# Patient Record
Sex: MALE | Race: WHITE | NOT HISPANIC OR LATINO | Employment: FULL TIME | URBAN - NONMETROPOLITAN AREA
[De-identification: names, ages, dates, MRNs, and addresses within clinical notes are randomized per-mention and may not be internally consistent; named-entity substitution may affect disease eponyms.]

---

## 2017-05-17 ENCOUNTER — OFFICE VISIT (OUTPATIENT)
Dept: OTOLARYNGOLOGY | Facility: CLINIC | Age: 52
End: 2017-05-17

## 2017-05-17 VITALS
DIASTOLIC BLOOD PRESSURE: 85 MMHG | TEMPERATURE: 97.8 F | BODY MASS INDEX: 30.44 KG/M2 | HEIGHT: 66 IN | HEART RATE: 80 BPM | WEIGHT: 189.38 LBS | SYSTOLIC BLOOD PRESSURE: 140 MMHG

## 2017-05-17 DIAGNOSIS — J01.90 ACUTE SINUSITIS, RECURRENCE NOT SPECIFIED, UNSPECIFIED LOCATION: ICD-10-CM

## 2017-05-17 DIAGNOSIS — J32.9 CHRONIC SINUSITIS, UNSPECIFIED LOCATION: ICD-10-CM

## 2017-05-17 DIAGNOSIS — T48.5X5A RHINITIS MEDICAMENTOSA: Primary | ICD-10-CM

## 2017-05-17 DIAGNOSIS — J31.0 RHINITIS MEDICAMENTOSA: Primary | ICD-10-CM

## 2017-05-17 DIAGNOSIS — J30.9 ALLERGIC RHINITIS, UNSPECIFIED ALLERGIC RHINITIS TRIGGER, UNSPECIFIED RHINITIS SEASONALITY: ICD-10-CM

## 2017-05-17 DIAGNOSIS — J34.3 HYPERTROPHY OF INFERIOR NASAL TURBINATE: ICD-10-CM

## 2017-05-17 PROCEDURE — 99203 OFFICE O/P NEW LOW 30 MIN: CPT | Performed by: NURSE PRACTITIONER

## 2017-05-17 RX ORDER — OMEPRAZOLE 20 MG/1
20 CAPSULE, DELAYED RELEASE ORAL DAILY
COMMUNITY

## 2017-05-17 RX ORDER — MOMETASONE FUROATE 50 UG/1
2 SPRAY, METERED NASAL DAILY
Qty: 17 G | Refills: 5 | Status: SHIPPED | OUTPATIENT
Start: 2017-05-17

## 2017-05-17 RX ORDER — OLOPATADINE HYDROCHLORIDE 665 UG/1
2 SPRAY NASAL 2 TIMES DAILY
Qty: 1 BOTTLE | Refills: 5 | Status: SHIPPED | OUTPATIENT
Start: 2017-05-17

## 2017-05-17 RX ORDER — PSEUDOEPHEDRINE HCL 30 MG
60 TABLET ORAL 3 TIMES DAILY PRN
Qty: 120 TABLET | Refills: 0 | Status: SHIPPED | OUTPATIENT
Start: 2017-05-17

## 2017-05-17 RX ORDER — FEXOFENADINE HCL 180 MG/1
TABLET ORAL
Refills: 3 | COMMUNITY
Start: 2017-05-02 | End: 2017-05-17

## 2017-05-17 RX ORDER — AMOXICILLIN AND CLAVULANATE POTASSIUM 875; 125 MG/1; MG/1
1 TABLET, FILM COATED ORAL 2 TIMES DAILY
Qty: 20 TABLET | Refills: 0 | Status: SHIPPED | OUTPATIENT
Start: 2017-05-17 | End: 2017-05-27

## 2017-05-17 RX ORDER — LEVOCETIRIZINE DIHYDROCHLORIDE 5 MG/1
5 TABLET, FILM COATED ORAL EVERY EVENING
Qty: 30 TABLET | Refills: 5 | Status: SHIPPED | OUTPATIENT
Start: 2017-05-17 | End: 2017-06-16

## 2017-05-17 RX ORDER — NEBIVOLOL HYDROCHLORIDE 5 MG/1
TABLET ORAL
Refills: 2 | COMMUNITY
Start: 2017-05-05

## 2017-05-30 ENCOUNTER — OFFICE VISIT (OUTPATIENT)
Dept: OTOLARYNGOLOGY | Facility: CLINIC | Age: 52
End: 2017-05-30

## 2017-05-30 VITALS
WEIGHT: 188.25 LBS | HEIGHT: 66 IN | SYSTOLIC BLOOD PRESSURE: 144 MMHG | DIASTOLIC BLOOD PRESSURE: 83 MMHG | HEART RATE: 84 BPM | TEMPERATURE: 98.6 F | BODY MASS INDEX: 30.25 KG/M2

## 2017-05-30 DIAGNOSIS — J34.3 HYPERTROPHY OF INFERIOR NASAL TURBINATE: ICD-10-CM

## 2017-05-30 DIAGNOSIS — J30.9 ALLERGIC RHINITIS, UNSPECIFIED ALLERGIC RHINITIS TRIGGER, UNSPECIFIED RHINITIS SEASONALITY: Primary | ICD-10-CM

## 2017-05-30 DIAGNOSIS — H10.13 ALLERGIC CONJUNCTIVITIS, BILATERAL: ICD-10-CM

## 2017-05-30 DIAGNOSIS — J32.9 CHRONIC SINUSITIS, UNSPECIFIED LOCATION: ICD-10-CM

## 2017-05-30 PROCEDURE — 99213 OFFICE O/P EST LOW 20 MIN: CPT | Performed by: NURSE PRACTITIONER

## 2017-05-30 RX ORDER — AZELASTINE HYDROCHLORIDE 0.5 MG/ML
1 SOLUTION/ DROPS OPHTHALMIC 2 TIMES DAILY
Qty: 1 EACH | Refills: 5 | Status: SHIPPED | OUTPATIENT
Start: 2017-05-30 | End: 2017-07-07

## 2017-06-20 DIAGNOSIS — J34.3 HYPERTROPHY OF INFERIOR NASAL TURBINATE: ICD-10-CM

## 2017-06-20 DIAGNOSIS — J30.9 ALLERGIC RHINITIS, UNSPECIFIED ALLERGIC RHINITIS TRIGGER, UNSPECIFIED RHINITIS SEASONALITY: ICD-10-CM

## 2017-06-20 DIAGNOSIS — J31.0 RHINITIS MEDICAMENTOSA: ICD-10-CM

## 2017-06-20 DIAGNOSIS — J01.90 ACUTE SINUSITIS, RECURRENCE NOT SPECIFIED, UNSPECIFIED LOCATION: ICD-10-CM

## 2017-06-20 DIAGNOSIS — T48.5X5A RHINITIS MEDICAMENTOSA: ICD-10-CM

## 2017-07-07 ENCOUNTER — OFFICE VISIT (OUTPATIENT)
Dept: OTOLARYNGOLOGY | Facility: CLINIC | Age: 52
End: 2017-07-07

## 2017-07-07 VITALS
HEART RATE: 56 BPM | DIASTOLIC BLOOD PRESSURE: 96 MMHG | SYSTOLIC BLOOD PRESSURE: 154 MMHG | BODY MASS INDEX: 29.73 KG/M2 | HEIGHT: 66 IN | WEIGHT: 185 LBS | TEMPERATURE: 97.7 F

## 2017-07-07 DIAGNOSIS — H10.13 ALLERGIC CONJUNCTIVITIS, BILATERAL: ICD-10-CM

## 2017-07-07 DIAGNOSIS — J34.3 HYPERTROPHY OF INFERIOR NASAL TURBINATE: ICD-10-CM

## 2017-07-07 DIAGNOSIS — T48.5X5A RHINITIS MEDICAMENTOSA: Primary | ICD-10-CM

## 2017-07-07 DIAGNOSIS — J30.9 ALLERGIC RHINITIS, UNSPECIFIED ALLERGIC RHINITIS TRIGGER, UNSPECIFIED RHINITIS SEASONALITY: ICD-10-CM

## 2017-07-07 DIAGNOSIS — J32.9 CHRONIC SINUSITIS, UNSPECIFIED LOCATION: ICD-10-CM

## 2017-07-07 DIAGNOSIS — J31.0 RHINITIS MEDICAMENTOSA: Primary | ICD-10-CM

## 2017-07-07 PROCEDURE — 99213 OFFICE O/P EST LOW 20 MIN: CPT | Performed by: NURSE PRACTITIONER

## 2017-07-07 NOTE — PROGRESS NOTES
YOB: 1965  Location: [facility]  Location Address: [address] [place] [address], [facility], [address] [place] [address]  Location Phone: [phone]    Chief Complaint   Patient presents with   • Allergic Rhinitis     Needs allergy testing       History of Present Illness  Cecil Saucedo is a 52 y.o. male.  Cecil Saucedo is here for evaluation of ENT complaints. The patient has had problems with nasal congestion, nasal obstruction and allergy  The symptoms are not localized to a particular location. The patient has had severe symptoms. The symptoms have been present for the last several months . The symptoms are aggravated by  allergy, OTC nasal sprays.  The symptoms are improved by medications.  Hx of septo/ITR  He is significantly improved with meds. He works for the [employer], mowing outside jobs.  He was unable to complete intradermal allergy testing due to beta blocker.  He is off of all meds for the time being.                     Past Medical History:   Diagnosis Date   • Allergic rhinitis    • Chronic sinusitis    • Deviated septum    • GERD (gastroesophageal reflux disease)    • Hypertension    • Hypertrophy of nasal turbinates    • Refractory obstruction of nasal airway    • Rhinitis medicamentosa        Past Surgical History:   Procedure Laterality Date   • ADENOIDECTOMY     • KNEE SURGERY     • NECK SURGERY     • SHOULDER SURGERY     • SINUS SURGERY      kest, FESS, ITR   • TONSILLECTOMY           Current Outpatient Prescriptions:   •  BYSTOLIC 5 MG tablet, TAKE 1 TABLET BY MOUTH ONCE DAILY, Disp: , Rfl: 2  •  omeprazole (priLOSEC) 20 MG capsule, Take 20 mg by mouth Daily., Disp: , Rfl:   •  mometasone (NASONEX) 50 MCG/ACT nasal spray, 2 sprays into each nostril Daily., Disp: 17 g, Rfl: 5  •  olopatadine (PATANASE) 0.6 % solution nasal solution, 2 sprays by Each Nare route 2 (Two) Times a Day., Disp: 1 bottle, Rfl: 5  •  pseudoephedrine (SUDAFED) 30 MG tablet, Take 2 tablets by mouth 3 (Three) Times a Day  As Needed for Congestion., Disp: 120 tablet, Rfl: 0    Review of patient's allergies indicates no known allergies.    Family History   Problem Relation Age of Onset   • No Known Problems Mother    • No Known Problems Father        Social History     Social History   • Marital status:      Spouse name: N/A   • Number of children: N/A   • Years of education: N/A     Occupational History   • Not on file.     Social History Main Topics   • Smoking status: Never Smoker   • Smokeless tobacco: Never Used   • Alcohol use No   • Drug use: Defer   • Sexual activity: Not on file     Other Topics Concern   • Not on file     Social History Narrative       Review of Systems   Constitutional: Negative.    HENT:        SEE HPI   Eyes: Negative.    Respiratory: Negative.    Cardiovascular: Negative.    Gastrointestinal: Negative.    Endocrine: Negative.    Genitourinary: Negative.    Musculoskeletal: Negative.    Skin: Negative.    Allergic/Immunologic: Negative.    Neurological: Negative.    Hematological: Negative.    Psychiatric/Behavioral: Negative.        Vitals:    07/07/17 1019   BP: 154/96   Pulse: 56   Temp: 97.7 °F (36.5 °C)       Objective     Physical Exam  CONSTITUTIONAL: well nourished, alert, oriented, in no acute distress     COMMUNICATION AND VOICE: able to communicate normally, normal voice quality    HEAD: normocephalic, no lesions, atraumatic, no tenderness, no masses     FACE: appearance normal, no lesions, no tenderness, no deformities, facial motion symmetric    SALIVARY GLANDS: parotid glands with no tenderness, no swelling, no masses, submandibular glands with normal size, nontender    EYES: ocular motility normal, eyelids normal, orbits normal, no proptosis, conjunctiva inflammation, pupils equal, round     EARS:  Hearing: response to conversational voice normal bilaterally   External Ears: auricles without lesions  Otoscopic: tympanic membrane appearance normal, no lesions, no perforation, normal  mobility, no fluid    NOSE:  External Nose: structure normal, no tenderness on palpation, no nasal discharge, no lesions, no evidence of trauma, nostrils patent   Intranasal Exam: nasal mucosa edema, vestibule within normal limits, inferior turbinate hypertrophy moderate, nasal septum midline    ORAL:  Lips: upper and lower lips without lesion   Teeth: dentition within normal limits for age   Gums: gingivae healthy   Oral Mucosa: oral mucosa normal, no mucosal lesions   Floor of Mouth: Warthin’s duct patent, mucosa normal  Tongue: lingual mucosa normal without lesions, normal tongue mobility   Palate: soft and hard palates with normal mucosa and structure  Oropharynx: oropharyngeal mucosa normal    NECK: neck appearance normal, no mass,  noted without erythema or tenderness    THYROID: no overt thyromegaly, no tenderness, nodules or mass present on palpation, position midline     LYMPH NODES: no lymphadenopathy    CHEST/RESPIRATORY: respiratory effort normal    CARDIOVASCULAR: extremities without cyanosis or edema      NEUROLOGIC/PSYCHIATRIC: oriented to time, place and person, mood normal, affect appropriate, CN II-XII intact grossly    Assessment/Plan   Cecil was seen today for allergic rhinitis.    Diagnoses and all orders for this visit:    Rhinitis medicamentosa    Allergic rhinitis, unspecified allergic rhinitis trigger, unspecified rhinitis seasonality    Hypertrophy of inferior nasal turbinate    Allergic conjunctivitis, bilateral    Chronic sinusitis, unspecified location      * Surgery not found *  No orders of the defined types were placed in this encounter.    Return in about 6 months (around 1/7/2018).       Patient Instructions   Avoidance of allergies discussed.  Use masks when performing yardwork or cleaning activities if indicated.  Continue allergy medications as discussed.    Air purifier as needed.  If on nasal sprays, recommend to use daily as indicated.   Medical vs surgical options  discussed.    Immunocap allergy testing    Resume meds

## 2020-02-17 ENCOUNTER — NURSE TRIAGE (OUTPATIENT)
Dept: CALL CENTER | Facility: HOSPITAL | Age: 55
End: 2020-02-17

## 2020-02-18 NOTE — TELEPHONE ENCOUNTER
"Reviewed guideline with caller advises he be seen within 4 hours. Caller agrees to follow care advice.   Reason for Disposition  • [1] SEVERE mouth pain (e.g., excruciating) AND [2] not improved after 2 hours of pain medicine    Additional Information  • Negative: Severe difficulty breathing (e.g., struggling for each breath, speaks in single words, stridor)  • Negative: Sounds like a life-threatening emergency to the triager  • Negative: Followed a tooth (or teeth) injury  • Negative: Followed a mouth injury  • Negative: Throat is painful  • Negative: Canker sore suspected (i.e., aphthous ulcer) in the mouth  • Negative: Cold sore suspected (i.e., fever blister sore) on the outer lip  • Negative: Tooth is painful or swelling around a tooth  • Negative: [1] Drooling or spitting out saliva (because can't swallow) AND [2] new onset  • Negative: Electrical burn of mouth  • Negative: Chemical burn of mouth  • Negative: Tongue is very swollen and tender  • Negative: [1] Difficulty breathing AND [2] not severe  • Negative: [1] Face is swollen AND [2] fever  • Negative: [1] Drinking very little AND [2] dehydration suspected (e.g., no urine > 12 hours, very dry mouth, very lightheaded)  • Negative: Patient sounds very sick or weak to the triager    Answer Assessment - Initial Assessment Questions  1. ONSET: \"When did the mouth start hurting?\" (e.g., hours or days ago)       1800 this evening, but has had it off and on for about a week  2. SEVERITY: \"How bad is the pain?\" (Scale 1-10; mild, moderate or severe)    - MILD (1-3):  doesn't interfere with eating or normal activities    - MODERATE (4-7): interferes with eating some solids and normal activities    - SEVERE (8-10):  excruciating pain, interferes with most normal activities    - SEVERE DYSPHAGIA: can't swallow liquids, drooling      10/10  3. SORES: \"Are there any sores or ulcers in the mouth?\" If so, ask: \"What part of the mouth are the sores in?\"      no  4. FEVER: " "\"Do you have a fever?\" If so, ask: \"What is your temperature, how was it measured, and when did it start?\"      No   5. CAUSE: \"What do you think is causing the mouth pain?\"      Diagnosed with trigeminal neuralgia   6. OTHER SYMPTOMS: \"Do you have any other symptoms?\" (e.g., difficulty breathing)      no    Protocols used: MOUTH PAIN-ADULT-AH      "

## 2021-10-04 ENCOUNTER — HOSPITAL ENCOUNTER (OUTPATIENT)
Dept: CT IMAGING | Age: 56
Discharge: HOME | End: 2021-10-04
Payer: COMMERCIAL

## 2021-10-04 ENCOUNTER — HOSPITAL ENCOUNTER (OUTPATIENT)
Dept: INFUSION THERAPY | Age: 56
Setting detail: INFUSION SERIES
Discharge: HOME | End: 2021-10-04
Payer: COMMERCIAL

## 2021-10-04 VITALS
HEART RATE: 54 BPM | TEMPERATURE: 97.2 F | OXYGEN SATURATION: 98 % | DIASTOLIC BLOOD PRESSURE: 72 MMHG | RESPIRATION RATE: 18 BRPM | SYSTOLIC BLOOD PRESSURE: 129 MMHG

## 2021-10-04 DIAGNOSIS — U07.1 COVID-19: Primary | ICD-10-CM

## 2021-10-04 DIAGNOSIS — R07.1 CHEST PAIN ON BREATHING: ICD-10-CM

## 2021-10-04 DIAGNOSIS — U07.1 COVID-19: ICD-10-CM

## 2021-10-04 PROCEDURE — 6360000002 HC RX W HCPCS: Performed by: NURSE PRACTITIONER

## 2021-10-04 PROCEDURE — 6360000004 HC RX CONTRAST MEDICATION: Performed by: NURSE PRACTITIONER

## 2021-10-04 PROCEDURE — 71275 CT ANGIOGRAPHY CHEST: CPT

## 2021-10-04 PROCEDURE — M0243 CASIRIVI AND IMDEVI INFUSION: HCPCS

## 2021-10-04 PROCEDURE — 96374 THER/PROPH/DIAG INJ IV PUSH: CPT

## 2021-10-04 PROCEDURE — 6370000000 HC RX 637 (ALT 250 FOR IP): Performed by: NURSE PRACTITIONER

## 2021-10-04 PROCEDURE — 2580000003 HC RX 258: Performed by: NURSE PRACTITIONER

## 2021-10-04 RX ORDER — DIPHENHYDRAMINE HYDROCHLORIDE 50 MG/ML
25 INJECTION INTRAMUSCULAR; INTRAVENOUS ONCE
Status: CANCELLED
Start: 2021-10-04 | End: 2021-10-04

## 2021-10-04 RX ORDER — SODIUM CHLORIDE 9 MG/ML
INJECTION, SOLUTION INTRAVENOUS CONTINUOUS
Status: CANCELLED | OUTPATIENT
Start: 2021-10-04

## 2021-10-04 RX ORDER — AMLODIPINE AND OLMESARTAN MEDOXOMIL 5; 40 MG/1; MG/1
TABLET ORAL
COMMUNITY
Start: 2021-09-27

## 2021-10-04 RX ORDER — SODIUM CHLORIDE 0.9 % (FLUSH) 0.9 %
5-40 SYRINGE (ML) INJECTION PRN
Status: CANCELLED | OUTPATIENT
Start: 2021-10-04

## 2021-10-04 RX ORDER — METHYLPREDNISOLONE SODIUM SUCCINATE 125 MG/2ML
125 INJECTION, POWDER, LYOPHILIZED, FOR SOLUTION INTRAMUSCULAR; INTRAVENOUS ONCE
Status: CANCELLED | OUTPATIENT
Start: 2021-10-04 | End: 2021-10-04

## 2021-10-04 RX ORDER — BISOPROLOL FUMARATE AND HYDROCHLOROTHIAZIDE 10; 6.25 MG/1; MG/1
TABLET ORAL
COMMUNITY
Start: 2021-09-27

## 2021-10-04 RX ORDER — DIPHENHYDRAMINE HYDROCHLORIDE 50 MG/ML
50 INJECTION INTRAMUSCULAR; INTRAVENOUS ONCE
Status: CANCELLED | OUTPATIENT
Start: 2021-10-04 | End: 2021-10-04

## 2021-10-04 RX ORDER — ACETAMINOPHEN 325 MG/1
650 TABLET ORAL ONCE
Status: COMPLETED | OUTPATIENT
Start: 2021-10-04 | End: 2021-10-04

## 2021-10-04 RX ORDER — ACETAMINOPHEN 325 MG/1
650 TABLET ORAL ONCE
Status: CANCELLED
Start: 2021-10-04 | End: 2021-10-04

## 2021-10-04 RX ORDER — SODIUM CHLORIDE 0.9 % (FLUSH) 0.9 %
5-40 SYRINGE (ML) INJECTION PRN
Status: DISCONTINUED | OUTPATIENT
Start: 2021-10-04 | End: 2021-10-05 | Stop reason: HOSPADM

## 2021-10-04 RX ORDER — DIPHENHYDRAMINE HYDROCHLORIDE 50 MG/ML
25 INJECTION INTRAMUSCULAR; INTRAVENOUS ONCE
Status: COMPLETED | OUTPATIENT
Start: 2021-10-04 | End: 2021-10-04

## 2021-10-04 RX ORDER — OMEPRAZOLE 40 MG/1
CAPSULE, DELAYED RELEASE ORAL
COMMUNITY
Start: 2021-09-18

## 2021-10-04 RX ORDER — EPINEPHRINE 1 MG/ML
0.3 INJECTION, SOLUTION, CONCENTRATE INTRAVENOUS PRN
Status: CANCELLED | OUTPATIENT
Start: 2021-10-04

## 2021-10-04 RX ORDER — SODIUM CHLORIDE 9 MG/ML
INJECTION, SOLUTION INTRAVENOUS CONTINUOUS
Status: DISCONTINUED | OUTPATIENT
Start: 2021-10-04 | End: 2021-10-04 | Stop reason: HOSPADM

## 2021-10-04 RX ADMIN — IOPAMIDOL 90 ML: 755 INJECTION, SOLUTION INTRAVENOUS at 14:56

## 2021-10-04 RX ADMIN — SODIUM CHLORIDE: 9 INJECTION, SOLUTION INTRAVENOUS at 12:57

## 2021-10-04 RX ADMIN — DIPHENHYDRAMINE HYDROCHLORIDE 25 MG: 50 INJECTION, SOLUTION INTRAMUSCULAR; INTRAVENOUS at 12:57

## 2021-10-04 RX ADMIN — ACETAMINOPHEN 650 MG: 325 TABLET ORAL at 12:57

## 2021-10-04 RX ADMIN — CASIRIVIMAB AND IMDEVIMAB 1200 MG: 600; 600 INJECTION, SOLUTION, CONCENTRATE INTRAVENOUS at 13:06

## 2021-10-04 ASSESSMENT — PAIN SCALES - GENERAL: PAINLEVEL_OUTOF10: 0

## 2021-10-04 NOTE — PROGRESS NOTES
PIV R FA left in place for patient to have CT w/ contrast.      Patient tolerated monoclonal antibody infusion therapy without s/s of adverse reaction. Patient observed one hour post observation. Discharge instructions provided. Patient verbalizes understanding of discharge instructions and advice to seek emergent medical care in the event of adverse reaction (fever, chills, changes in heartbeat, shortness of air, wheezing, swelling of lips, face or throat, rash including hives, itching, headache, nausea, vomiting, sweating, muscle aches, dizziness and shivering. Patient discharged from monoclonal clinic to home.  Electronically signed by Caden Bruner RN on 10/4/2021 at 2:27 PM

## 2021-10-04 NOTE — PROGRESS NOTES
Patient/Caregiver given FACT SHEET for EMERGENCY USE AUTHORIZATION for monoclonal antibody therapy. Patient/Caregiver verbalize understanding of EMERGENCY USE AUTHORIZATION and understand full FDA approval has not been granted because medication is still being studied. Patient/Caregiver verbalize understanding regarding known safety and effectiveness and understand limited data is available regarding full risks and benefits. Patient/Caregiver understand there is a continued need to self-isolate and continue all infection control measures. Patient/Caregiver choose to proceed with monoclonal antibody infusion therapy. Patient/Caregiver signed a copy of the fact sheet for emergency use authorization. Copy placed in patient medical record.  Electronically signed by Yasmeen Buckley RN on 10/4/2021 at 12:57 PM

## 2023-08-07 ENCOUNTER — HOSPITAL ENCOUNTER (OUTPATIENT)
Dept: CT IMAGING | Age: 58
Discharge: HOME | End: 2023-08-07
Payer: COMMERCIAL

## 2023-08-07 DIAGNOSIS — R10.12 LEFT UPPER QUADRANT PAIN: ICD-10-CM

## 2023-08-07 DIAGNOSIS — N23 UNSPECIFIED RENAL COLIC: ICD-10-CM

## 2023-08-07 PROCEDURE — 74176 CT ABD & PELVIS W/O CONTRAST: CPT

## 2023-08-14 NOTE — PROGRESS NOTES
Chief Complaint  Kidney Stones    Subjective          Cecil Saucedo presents to [facility]   Patient with a right ureteropelvic junction obstruction secondary to calculus.  Severity is mild.  He is asymptomatic from this most likely.  He has been having about 3 weeks of severe pain in of his left epigastrium radiates through to the back.  He has had CT scan to evaluate this.  He is already on a proton pump inhibitor.  It is not related to meals, activity or anything else he can discern.      Current Outpatient Medications:     amlodipine-olmesartan (ANGEL) 10-40 MG per tablet, , Disp: , Rfl:     omeprazole (priLOSEC) 20 MG capsule, Take 1 capsule by mouth Daily., Disp: , Rfl:     tamsulosin (FLOMAX) 0.4 MG capsule 24 hr capsule, Take 1 capsule by mouth Daily., Disp: , Rfl:     vitamin D (ERGOCALCIFEROL) 1.25 MG (24321 UT) capsule capsule, Take 1 capsule by mouth 1 (One) Time Per Week., Disp: , Rfl:     BYSTOLIC 5 MG tablet, TAKE 1 TABLET BY MOUTH ONCE DAILY (Patient not taking: Reported on 8/17/2023), Disp: , Rfl: 2    mometasone (NASONEX) 50 MCG/ACT nasal spray, 2 sprays into each nostril Daily. (Patient not taking: Reported on 8/17/2023), Disp: 17 g, Rfl: 5    olopatadine (PATANASE) 0.6 % solution nasal solution, 2 sprays by Each Nare route 2 (Two) Times a Day. (Patient not taking: Reported on 8/17/2023), Disp: 1 bottle, Rfl: 5    pseudoephedrine (SUDAFED) 30 MG tablet, Take 2 tablets by mouth 3 (Three) Times a Day As Needed for Congestion. (Patient not taking: Reported on 8/17/2023), Disp: 120 tablet, Rfl: 0  Past Medical History:   Diagnosis Date    Allergic conjunctivitis     Allergic rhinitis     Chronic sinusitis     Deviated septum     GERD (gastroesophageal reflux disease)     Hypertension     Hypertrophy of nasal turbinates     Refractory obstruction of nasal airway     Rhinitis medicamentosa      Past Surgical History:   Procedure Laterality Date    ADENOIDECTOMY      KNEE SURGERY  "     NECK SURGERY      SHOULDER SURGERY      SINUS SURGERY  2010    jimmy, FESS, ITR    TONSILLECTOMY             Review of Systems      Objective   PHYSICAL EXAM  Vital Signs:   Temp 96 øF (35.6 øC)   Ht 167.6 cm (66\")   Wt 88.1 kg (194 lb 3.2 oz)   BMI 31.34 kg/mý     Physical Exam  Constitutional: Patient is without distress or deformity.  Vital signs are reviewed as above.    Neuro: No confusion; No disorientation; Alert and oriented  Pulmonary: No respiratory distress.   Skin: No pallor or diaphoresis  : No CVA tenderness over the flank.  GI: Abdomen is soft nontender nondistended no left upper quadrant to palpation.  Musculoskeletal: I cannot pinpoint the discomfort palpation of his torso musculature.  Does not reproduce pain when you have him raise the left leg.    DATA  Result Review :              Results for orders placed or performed in visit on 08/17/23   POC Urinalysis Dipstick, Multipro    Specimen: Urine   Result Value Ref Range    Color Yellow Yellow, Straw, Dark Yellow, Rubina    Clarity, UA Clear Clear    Glucose, UA Negative Negative mg/dL    Bilirubin Negative Negative    Ketones, UA Negative Negative    Specific Gravity  1.025 1.005 - 1.030    Blood, UA Trace (A) Negative    pH, Urine 6.0 5.0 - 8.0    Protein, POC Negative Negative mg/dL    Urobilinogen, UA 0.2 E.U./dL Normal, 0.2 E.U./dL    Nitrite, UA Negative Negative    Leukocytes Negative Negative     CT outside films (08/07/2023 09:03)   The images for the above \"link(s)\" were made available to me to review independently.  I also reviewed the radiologist's report described above with regard to the urologic findings. My interpretation is as follows:  There is a 6 mm calculus at the ureteropelvic junction with what I would describe is mild hydronephrosis.  Not much perinephric inflammation noted.  Moreover I do not see an etiology of his left upper quadrant discomfort.  /Abdelrahman Aguirre MD                   ASSESSMENT AND PLAN      "     Problem List Items Addressed This Visit    None  Visit Diagnoses       Obstruction of right ureteropelvic junction (UPJ) due to stone    -  Primary    Relevant Orders    CT Abdomen With & Without Contrast    Kidney stones        Relevant Orders    POC Urinalysis Dipstick, Multipro (Completed)    XR abdomen kub (Completed)    Left upper quadrant pain        Relevant Orders    CT Abdomen With & Without Contrast        I did explain to them that I have seen patients have contralateral discomfort from a obstructing stone before but that it is very unusual.  I cannot really explain the neurologic pattern to get discomfort like that.  I still think the pain is likely to another etiology.  I do think we should check on the status of the stone since there was some mild obstruction.  I recommended that he get a CT of the abdomen pelvis with and without contrast.  I do think he should get contrasted study that has oral contrast as well.      FOLLOW UP     No follow-ups on file.        (Please note that portions of this note were completed with a voice recognition program.)  Abdelrahman Aguirre MD  08/18/23  12:30 CDT

## 2023-08-16 ENCOUNTER — TELEPHONE (OUTPATIENT)
Dept: UROLOGY | Facility: CLINIC | Age: 58
End: 2023-08-16
Payer: COMMERCIAL

## 2023-08-17 ENCOUNTER — OFFICE VISIT (OUTPATIENT)
Dept: UROLOGY | Facility: CLINIC | Age: 58
End: 2023-08-17
Payer: COMMERCIAL

## 2023-08-17 ENCOUNTER — HOSPITAL ENCOUNTER (OUTPATIENT)
Dept: GENERAL RADIOLOGY | Facility: HOSPITAL | Age: 58
Discharge: HOME | End: 2023-08-17
Admitting: UROLOGY
Payer: COMMERCIAL

## 2023-08-17 VITALS — TEMPERATURE: 96 F | HEIGHT: 66 IN | BODY MASS INDEX: 31.21 KG/M2 | WEIGHT: 194.2 LBS

## 2023-08-17 DIAGNOSIS — N20.1 OBSTRUCTION OF RIGHT URETEROPELVIC JUNCTION (UPJ) DUE TO STONE: Primary | ICD-10-CM

## 2023-08-17 DIAGNOSIS — N20.0 KIDNEY STONES: ICD-10-CM

## 2023-08-17 DIAGNOSIS — R10.12 LEFT UPPER QUADRANT PAIN: ICD-10-CM

## 2023-08-17 LAB
BILIRUB BLD-MCNC: NEGATIVE MG/DL
CLARITY, POC: CLEAR
COLOR UR: YELLOW
GLUCOSE UR STRIP-MCNC: NEGATIVE MG/DL
KETONES UR QL: NEGATIVE
LEUKOCYTE EST, POC: NEGATIVE
NITRITE UR-MCNC: NEGATIVE MG/ML
PH UR: 6 [PH] (ref 5–8)
PROT UR STRIP-MCNC: NEGATIVE MG/DL
RBC # UR STRIP: ABNORMAL /UL
SP GR UR: 1.02 (ref 1–1.03)
UROBILINOGEN UR QL: ABNORMAL

## 2023-08-17 PROCEDURE — 74018 RADEX ABDOMEN 1 VIEW: CPT

## 2023-08-17 RX ORDER — AMLODIPINE AND OLMESARTAN MEDOXOMIL 10; 40 MG/1; MG/1
TABLET ORAL
COMMUNITY
Start: 2022-08-01

## 2023-08-17 RX ORDER — TAMSULOSIN HYDROCHLORIDE 0.4 MG/1
1 CAPSULE ORAL DAILY
COMMUNITY
Start: 2023-08-09

## 2023-08-17 RX ORDER — ERGOCALCIFEROL 1.25 MG/1
1 CAPSULE ORAL WEEKLY
COMMUNITY
Start: 2023-08-08

## 2023-08-22 DIAGNOSIS — N20.1 OBSTRUCTION OF RIGHT URETEROPELVIC JUNCTION (UPJ) DUE TO STONE: ICD-10-CM

## 2023-08-22 DIAGNOSIS — R10.13 EPIGASTRIC PAIN: ICD-10-CM

## 2023-08-22 DIAGNOSIS — R10.13 EPIGASTRIC PAIN: Primary | ICD-10-CM

## 2023-08-22 DIAGNOSIS — R10.12 LEFT UPPER QUADRANT PAIN: ICD-10-CM

## 2023-08-28 ENCOUNTER — TELEPHONE (OUTPATIENT)
Dept: UROLOGY | Facility: CLINIC | Age: 58
End: 2023-08-28
Payer: COMMERCIAL

## 2023-08-28 NOTE — TELEPHONE ENCOUNTER
Patients wife called to see if a referral had been put in for the patient yet. I let her know nothing had been put in, and that Dr. Aguirre would be out of the office until next Tuesday. She will call patients PCP to see about them sending a referral so the patient does not have to wait over a week since he is still in pain. I let her know if they need anything or if I can help in any way to please give me a call.

## 2023-09-08 ENCOUNTER — TRANSCRIBE ORDERS (OUTPATIENT)
Dept: ADMINISTRATIVE | Age: 58
End: 2023-09-08

## 2023-09-08 DIAGNOSIS — R93.2 ABNORMAL LIVER SCAN: Primary | ICD-10-CM

## 2023-09-12 ENCOUNTER — HOSPITAL ENCOUNTER (OUTPATIENT)
Dept: MRI IMAGING | Age: 58
Discharge: HOME | End: 2023-09-12
Payer: COMMERCIAL

## 2023-09-12 DIAGNOSIS — R93.2 ABNORMAL LIVER SCAN: ICD-10-CM

## 2023-09-12 PROCEDURE — 74181 MRI ABDOMEN W/O CONTRAST: CPT

## 2023-11-02 ENCOUNTER — OFFICE VISIT (OUTPATIENT)
Dept: GASTROENTEROLOGY | Facility: CLINIC | Age: 58
End: 2023-11-02
Payer: COMMERCIAL

## 2023-11-02 ENCOUNTER — LAB (OUTPATIENT)
Dept: LAB | Facility: HOSPITAL | Age: 58
End: 2023-11-02
Payer: COMMERCIAL

## 2023-11-02 VITALS
TEMPERATURE: 96.4 F | HEIGHT: 66 IN | OXYGEN SATURATION: 95 % | HEART RATE: 77 BPM | WEIGHT: 197.4 LBS | BODY MASS INDEX: 31.72 KG/M2 | DIASTOLIC BLOOD PRESSURE: 100 MMHG | SYSTOLIC BLOOD PRESSURE: 170 MMHG

## 2023-11-02 DIAGNOSIS — Z12.11 ENCOUNTER FOR HEMOCCULT SCREENING: ICD-10-CM

## 2023-11-02 DIAGNOSIS — Z79.1 NSAID LONG-TERM USE: ICD-10-CM

## 2023-11-02 DIAGNOSIS — K86.2 PANCREATIC CYST: ICD-10-CM

## 2023-11-02 DIAGNOSIS — R10.13 EPIGASTRIC PAIN: ICD-10-CM

## 2023-11-02 DIAGNOSIS — K76.0 FATTY LIVER: ICD-10-CM

## 2023-11-02 DIAGNOSIS — R10.13 EPIGASTRIC PAIN: Primary | ICD-10-CM

## 2023-11-02 LAB
ALBUMIN SERPL-MCNC: 4.1 G/DL (ref 3.5–5.2)
ALBUMIN/GLOB SERPL: 1.2 G/DL
ALP SERPL-CCNC: 57 U/L (ref 39–117)
ALT SERPL W P-5'-P-CCNC: 22 U/L (ref 1–41)
ANION GAP SERPL CALCULATED.3IONS-SCNC: 9 MMOL/L (ref 5–15)
AST SERPL-CCNC: 18 U/L (ref 1–40)
BASOPHILS # BLD AUTO: 0.06 10*3/MM3 (ref 0–0.2)
BASOPHILS NFR BLD AUTO: 0.6 % (ref 0–1.5)
BILIRUB SERPL-MCNC: 0.5 MG/DL (ref 0–1.2)
BUN SERPL-MCNC: 21 MG/DL (ref 8–23)
BUN/CREAT SERPL: 22.1 (ref 7–25)
CALCIUM SPEC-SCNC: 9.4 MG/DL (ref 8.6–10.5)
CHLORIDE SERPL-SCNC: 108 MMOL/L (ref 98–107)
CO2 SERPL-SCNC: 28 MMOL/L (ref 22–29)
CREAT SERPL-MCNC: 0.95 MG/DL (ref 0.76–1.27)
DEPRECATED RDW RBC AUTO: 49.1 FL (ref 37–54)
EGFRCR SERPLBLD CKD-EPI 2021: 91.6 ML/MIN/1.73
EOSINOPHIL # BLD AUTO: 0.17 10*3/MM3 (ref 0–0.4)
EOSINOPHIL NFR BLD AUTO: 1.8 % (ref 0.3–6.2)
ERYTHROCYTE [DISTWIDTH] IN BLOOD BY AUTOMATED COUNT: 14 % (ref 12.3–15.4)
GLOBULIN UR ELPH-MCNC: 3.3 GM/DL
GLUCOSE SERPL-MCNC: 95 MG/DL (ref 65–99)
HCT VFR BLD AUTO: 46 % (ref 37.5–51)
HGB BLD-MCNC: 14.8 G/DL (ref 13–17.7)
IMM GRANULOCYTES # BLD AUTO: 0.1 10*3/MM3 (ref 0–0.05)
IMM GRANULOCYTES NFR BLD AUTO: 1.1 % (ref 0–0.5)
LYMPHOCYTES # BLD AUTO: 1.49 10*3/MM3 (ref 0.7–3.1)
LYMPHOCYTES NFR BLD AUTO: 15.8 % (ref 19.6–45.3)
MCH RBC QN AUTO: 30.5 PG (ref 26.6–33)
MCHC RBC AUTO-ENTMCNC: 32.2 G/DL (ref 31.5–35.7)
MCV RBC AUTO: 94.7 FL (ref 79–97)
MONOCYTES # BLD AUTO: 0.89 10*3/MM3 (ref 0.1–0.9)
MONOCYTES NFR BLD AUTO: 9.4 % (ref 5–12)
NEUTROPHILS NFR BLD AUTO: 6.75 10*3/MM3 (ref 1.7–7)
NEUTROPHILS NFR BLD AUTO: 71.3 % (ref 42.7–76)
NRBC BLD AUTO-RTO: 0 /100 WBC (ref 0–0.2)
PLATELET # BLD AUTO: 296 10*3/MM3 (ref 140–450)
PMV BLD AUTO: 8.9 FL (ref 6–12)
POTASSIUM SERPL-SCNC: 4.1 MMOL/L (ref 3.5–5.2)
PROT SERPL-MCNC: 7.4 G/DL (ref 6–8.5)
RBC # BLD AUTO: 4.86 10*6/MM3 (ref 4.14–5.8)
SODIUM SERPL-SCNC: 145 MMOL/L (ref 136–145)
WBC NRBC COR # BLD: 9.46 10*3/MM3 (ref 3.4–10.8)

## 2023-11-02 PROCEDURE — 80053 COMPREHEN METABOLIC PANEL: CPT | Performed by: CLINICAL NURSE SPECIALIST

## 2023-11-02 PROCEDURE — 86140 C-REACTIVE PROTEIN: CPT

## 2023-11-02 PROCEDURE — 85652 RBC SED RATE AUTOMATED: CPT

## 2023-11-02 PROCEDURE — 36415 COLL VENOUS BLD VENIPUNCTURE: CPT

## 2023-11-02 PROCEDURE — 85025 COMPLETE CBC W/AUTO DIFF WBC: CPT | Performed by: CLINICAL NURSE SPECIALIST

## 2023-11-02 NOTE — H&P (VIEW-ONLY)
Cecil Saucedo  1965 11/2/2023  Chief Complaint   Patient presents with    GI Problem     Luq pain-epigastric pain     Subjective   HPI  Cecil Saucedo is a 58 y.o. male who presents with a complaint of abdominal pain to the epigastric region radiating through to his back persistent ongoing for a year. He rates his pain 7 out of 10. He says he has been to [facility] and seen someone there for this.  I look in Care Everywhere and see where he has seen Dr Devonte Huitron for pancreatic cyst. He says that the pain is described as a constant sharp and he says it radiates at times down his leg. He was put on steroids for the pain due to it radiating down his leg he says that this did help his leg. No nausea or vomiting. No wt loss. No fever chills or sweats. I do find labs showing amylase and lipase normal. He takes Prilosec for reflux years ago and this has helped him. He has never had endoscopy. He says he is able to eat whatever without difficulty. He is taking 4 Ibuprofen and 2 Tylenol every day for approx 1 month. He says he drink    No change in bowels. No rectal bleeding. His last colonoscopy was 10 years ago. No family hx for colon cancer.     MRCP 9/12/23 showed 1.1 cm pancreatic cyst, probable pancreatic divisum, mild hepatic steatosis.     Dr Huitron's note 9/20/23  58-year-old male presents in consultation for incidentally discovered 1.1 cm cyst in the body of the pancreas with no worrisome or high-risk features. This was discovered during workup of pain under the left ribcage associated with movement and worse in the morning which does improve upon movement throughout the day and is also alleviated by ibuprofen and Tylenol. He has undergone non con CT showing bilateral kidney stones and ultimately was found to have an incidental pancreatic cyst as previously mentioned. Underwent MRCP for further evaluation.    denies any increase in epigastric or subcostal pain following meals. States he is having appropriate  bowel function without issue. And only complains of pain during movement which is worse in the morning.  His Plan: I have personally reviewed the imaging studies and the pancreatic cyst has no high risk features. Therefore, follow-up in 1 year with MRCP for continued surveillance of pancreatic cyst.   Past Medical History:   Diagnosis Date    Allergic conjunctivitis     Allergic rhinitis     Cholelithiasis     Chronic sinusitis     Deviated septum     GERD (gastroesophageal reflux disease)     Hypertension     Hypertrophy of nasal turbinates     Refractory obstruction of nasal airway     Rhinitis medicamentosa      Past Surgical History:   Procedure Laterality Date    ADENOIDECTOMY      COLONOSCOPY  10/24/2013    normal exam excpetion hemorrhoids repeat 5 year    KNEE SURGERY      NECK SURGERY      SHOULDER SURGERY      SINUS SURGERY  2010    kest, FESS, ITR    TONSILLECTOMY         Outpatient Medications Marked as Taking for the 11/2/23 encounter (Office Visit) with Екатерина Carter APRN   Medication Sig Dispense Refill    amlodipine-olmesartan (ANGEL) 10-40 MG per tablet       omeprazole (priLOSEC) 20 MG capsule Take 1 capsule by mouth Daily.      vitamin D (ERGOCALCIFEROL) 1.25 MG (77609 UT) capsule capsule Take 1 capsule by mouth 1 (One) Time Per Week.       No Known Allergies  Social History     Socioeconomic History    Marital status:    Tobacco Use    Smoking status: Never    Smokeless tobacco: Never    Tobacco comments:     Dip daily   Substance and Sexual Activity    Alcohol use: No    Drug use: Never    Sexual activity: Yes     Partners: Female     Birth control/protection: Vasectomy     Family History   Problem Relation Age of Onset    No Known Problems Mother     No Known Problems Father     Colon cancer Neg Hx     Colon polyps Neg Hx      Health Maintenance   Topic Date Due    BMI FOLLOWUP  Never done    COLORECTAL CANCER SCREENING  Never done    COVID-19 Vaccine (1) Never done    ZOSTER  "VACCINE (1 of 2) Never done    TDAP/TD VACCINES (2 - Tdap) 10/04/2015    HEPATITIS C SCREENING  Never done    ANNUAL PHYSICAL  Never done    INFLUENZA VACCINE  Never done    Pneumococcal Vaccine 0-64  Aged Out     Review of Systems   Constitutional:  Negative for activity change, appetite change, chills, diaphoresis, fatigue, fever and unexpected weight change.   HENT:  Negative for ear pain, hearing loss, mouth sores, sore throat, trouble swallowing and voice change.    Eyes: Negative.    Respiratory:  Negative for cough, choking, shortness of breath and wheezing.    Cardiovascular:  Negative for chest pain and palpitations.   Gastrointestinal:  Positive for abdominal pain. Negative for blood in stool, constipation, diarrhea, nausea and vomiting.   Endocrine: Negative for cold intolerance and heat intolerance.   Genitourinary:  Negative for decreased urine volume, dysuria, frequency, hematuria and urgency.   Musculoskeletal:  Negative for back pain, gait problem and myalgias.   Skin:  Negative for color change, pallor and rash.   Allergic/Immunologic: Negative for food allergies and immunocompromised state.   Neurological:  Negative for dizziness, tremors, seizures, syncope, weakness, light-headedness, numbness and headaches.   Hematological:  Negative for adenopathy. Does not bruise/bleed easily.   Psychiatric/Behavioral:  Negative for agitation and confusion. The patient is not nervous/anxious.    All other systems reviewed and are negative.    Objective   Vitals:    11/02/23 1422   BP: 170/100   BP Location: Right arm   Pulse: 77   Temp: 96.4 °F (35.8 °C)   TempSrc: Temporal   SpO2: 95%   Weight: 89.5 kg (197 lb 6.4 oz)   Height: 167.6 cm (65.98\")     Body mass index is 31.88 kg/m².  Physical Exam  Constitutional:       Appearance: He is well-developed.   HENT:      Head: Normocephalic and atraumatic.   Eyes:      Pupils: Pupils are equal, round, and reactive to light.   Neck:      Trachea: No tracheal " deviation.   Cardiovascular:      Rate and Rhythm: Normal rate and regular rhythm.      Heart sounds: Normal heart sounds. No murmur heard.     No friction rub. No gallop.   Pulmonary:      Effort: Pulmonary effort is normal. No respiratory distress.      Breath sounds: Normal breath sounds. No wheezing or rales.   Chest:      Chest wall: No tenderness.   Abdominal:      General: Bowel sounds are normal. There is no distension.      Palpations: Abdomen is soft. Abdomen is not rigid.      Tenderness: There is no abdominal tenderness. There is no guarding or rebound.   Musculoskeletal:         General: No tenderness or deformity. Normal range of motion.      Cervical back: Normal range of motion and neck supple.   Skin:     General: Skin is warm and dry.      Coloration: Skin is not pale.      Findings: No rash.   Neurological:      Mental Status: He is alert and oriented to person, place, and time.      Deep Tendon Reflexes: Reflexes are normal and symmetric.   Psychiatric:         Behavior: Behavior normal.         Thought Content: Thought content normal.         Judgment: Judgment normal.       Assessment & Plan   Diagnoses and all orders for this visit:    1. Epigastric pain (Primary)  -     Case Request; Standing  -     Case Request  -     Comprehensive Metabolic Panel  -     CBC & Differential  -     Sedimentation Rate; Future  -     C-reactive Protein; Future  -     polyethylene glycol (GoLYTELY) 236 g solution; Take as directed by office instructions.  Dispense: 4000 mL; Refill: 0    2. NSAID long-term use    3. Encounter for Hemoccult screening    4. Pancreatic cyst  Comments:  repeat MRCP in 1 year 9/2024 he would like to do here    5. Fatty liver    Other orders  -     Implement Anesthesia Orders Day of Procedure; Standing  -     Obtain Informed Consent; Standing  -     Follow Anesthesia Guidelines / Protocol; Future  -     Obtain Informed Consent; Future  -     Verify Bowel Prep Was Successful;  Standing    I discussed how fatty liver can lead to cirrhosis, disability and pre-mature death.  How it also can be a sign of increased risk for cardiovascular disease.  I discussed the importance of getting rid of fat in the liver by controlling lipids and glucose, avoiding etoh helps, and gradual weight loss to ideal body weight is very important.     Stay on PPI  avoid NSAIDS  He doesn't currently drink ETOH and no significant hx discussed BMI and healthy eating to reduce this risk.       ESOPHAGOGASTRODUODENOSCOPY WITH ANESTHESIA (N/A), COLONOSCOPY WITH ANESTHESIA (N/A)  Part of this note may be an electronic transcription/translation of spoken language to printed text using the Dragon Dictation System.  Body mass index is 31.88 kg/m².  No follow-ups on file.    BMI is >= 30 and <35. (Class 1 Obesity). The following options were offered after discussion;: weight loss educational material (shared in after visit summary)      All risks, benefits, alternatives, and indications of colonoscopy and/or Endoscopy procedure have been discussed with the patient. Risks to include perforation of the colon requiring possible surgery or colostomy, risk of bleeding from biopsies or removal of colon tissue, possibility of missing a colon polyp or cancer, or adverse drug reaction.  Benefits to include the diagnosis and management of disease of the colon and rectum. Alternatives to include barium enema, radiographic evaluation, lab testing or no intervention. Pt verbalizes understanding and agrees.     Екатерина Carter, APRN  11/2/2023  15:28 CDT          If you smoke or use tobacco, 4 minutes reading provided  Steps to Quit Smoking  Smoking tobacco can be harmful to your health and can affect almost every organ in your body. Smoking puts you, and those around you, at risk for developing many serious chronic diseases. Quitting smoking is difficult, but it is one of the best things that you can do for your health. It is never  too late to quit.  What are the benefits of quitting smoking?  When you quit smoking, you lower your risk of developing serious diseases and conditions, such as:  Lung cancer or lung disease, such as COPD.  Heart disease.  Stroke.  Heart attack.  Infertility.  Osteoporosis and bone fractures.  Additionally, symptoms such as coughing, wheezing, and shortness of breath may get better when you quit. You may also find that you get sick less often because your body is stronger at fighting off colds and infections. If you are pregnant, quitting smoking can help to reduce your chances of having a baby of low birth weight.  How do I get ready to quit?  When you decide to quit smoking, create a plan to make sure that you are successful. Before you quit:  Pick a date to quit. Set a date within the next two weeks to give you time to prepare.  Write down the reasons why you are quitting. Keep this list in places where you will see it often, such as on your bathroom mirror or in your car or wallet.  Identify the people, places, things, and activities that make you want to smoke (triggers) and avoid them. Make sure to take these actions:  Throw away all cigarettes at home, at work, and in your car.  Throw away smoking accessories, such as ashtrays and lighters.  Clean your car and make sure to empty the ashtray.  Clean your home, including curtains and carpets.  Tell your family, friends, and coworkers that you are quitting. Support from your loved ones can make quitting easier.  Talk with your health care provider about your options for quitting smoking.  Find out what treatment options are covered by your health insurance.  What strategies can I use to quit smoking?  Talk with your healthcare provider about different strategies to quit smoking. Some strategies include:  Quitting smoking altogether instead of gradually lessening how much you smoke over a period of time. Research shows that quitting “cold turkey” is more  successful than gradually quitting.  Attending in-person counseling to help you build problem-solving skills. You are more likely to have success in quitting if you attend several counseling sessions. Even short sessions of 10 minutes can be effective.  Finding resources and support systems that can help you to quit smoking and remain smoke-free after you quit. These resources are most helpful when you use them often. They can include:  Online chats with a counselor.  Telephone quitlines.  Printed self-help materials.  Support groups or group counseling.  Text messaging programs.  Mobile phone applications.  Taking medicines to help you quit smoking. (If you are pregnant or breastfeeding, talk with your health care provider first.) Some medicines contain nicotine and some do not. Both types of medicines help with cravings, but the medicines that include nicotine help to relieve withdrawal symptoms. Your health care provider may recommend:  Nicotine patches, gum, or lozenges.  Nicotine inhalers or sprays.  Non-nicotine medicine that is taken by mouth.  Talk with your health care provider about combining strategies, such as taking medicines while you are also receiving in-person counseling. Using these two strategies together makes you more likely to succeed in quitting than if you used either strategy on its own.  If you are pregnant or breastfeeding, talk with your health care provider about finding counseling or other support strategies to quit smoking. Do not take medicine to help you quit smoking unless told to do so by your health care provider.  What things can I do to make it easier to quit?  Quitting smoking might feel overwhelming at first, but there is a lot that you can do to make it easier. Take these important actions:  Reach out to your family and friends and ask that they support and encourage you during this time. Call telephone quitlines, reach out to support groups, or work with a counselor for  support.  Ask people who smoke to avoid smoking around you.  Avoid places that trigger you to smoke, such as bars, parties, or smoke-break areas at work.  Spend time around people who do not smoke.  Lessen stress in your life, because stress can be a smoking trigger for some people. To lessen stress, try:  Exercising regularly.  Deep-breathing exercises.  Yoga.  Meditating.  Performing a body scan. This involves closing your eyes, scanning your body from head to toe, and noticing which parts of your body are particularly tense. Purposefully relax the muscles in those areas.  Download or purchase mobile phone or tablet apps (applications) that can help you stick to your quit plan by providing reminders, tips, and encouragement. There are many free apps, such as QuitGuide from the CDC (Centers for Disease Control and Prevention). You can find other support for quitting smoking (smoking cessation) through smokefree.gov and other websites.  How will I feel when I quit smoking?  Within the first 24 hours of quitting smoking, you may start to feel some withdrawal symptoms. These symptoms are usually most noticeable 2-3 days after quitting, but they usually do not last beyond 2-3 weeks. Changes or symptoms that you might experience include:  Mood swings.  Restlessness, anxiety, or irritation.  Difficulty concentrating.  Dizziness.  Strong cravings for sugary foods in addition to nicotine.  Mild weight gain.  Constipation.  Nausea.  Coughing or a sore throat.  Changes in how your medicines work in your body.  A depressed mood.  Difficulty sleeping (insomnia).  After the first 2-3 weeks of quitting, you may start to notice more positive results, such as:  Improved sense of smell and taste.  Decreased coughing and sore throat.  Slower heart rate.  Lower blood pressure.  Clearer skin.  The ability to breathe more easily.  Fewer sick days.  Quitting smoking is very challenging for most people. Do not get discouraged if you are  not successful the first time. Some people need to make many attempts to quit before they achieve long-term success. Do your best to stick to your quit plan, and talk with your health care provider if you have any questions or concerns.  This information is not intended to replace advice given to you by your health care provider. Make sure you discuss any questions you have with your health care provider.  Document Released: 12/12/2002 Document Revised: 08/15/2017 Document Reviewed: 05/03/2016  [employer] Interactive Patient Education © 2017 [employer]

## 2023-11-02 NOTE — PROGRESS NOTES
Cecil Saucedo  1965 11/2/2023  Chief Complaint   Patient presents with    GI Problem     Luq pain-epigastric pain     Subjective   HPI  Cecil Saucedo is a 58 y.o. male who presents with a complaint of abdominal pain to the epigastric region radiating through to his back persistent ongoing for a year. He rates his pain 7 out of 10. He says he has been to [place] and seen someone there for this.  I look in Care Everywhere and see where he has seen Dr Devonte Huitron for pancreatic cyst. He says that the pain is described as a constant sharp and he says it radiates at times down his leg. He was put on steroids for the pain due to it radiating down his leg he says that this did help his leg. No nausea or vomiting. No wt loss. No fever chills or sweats. I do find labs showing amylase and lipase normal. He takes Prilosec for reflux years ago and this has helped him. He has never had endoscopy. He says he is able to eat whatever without difficulty. He is taking 4 Ibuprofen and 2 Tylenol every day for approx 1 month. He says he drink    No change in bowels. No rectal bleeding. His last colonoscopy was 10 years ago. No family hx for colon cancer.     MRCP 9/12/23 showed 1.1 cm pancreatic cyst, probable pancreatic divisum, mild hepatic steatosis.     Dr Huitron's note 9/20/23  58-year-old male presents in consultation for incidentally discovered 1.1 cm cyst in the body of the pancreas with no worrisome or high-risk features. This was discovered during workup of pain under the left ribcage associated with movement and worse in the morning which does improve upon movement throughout the day and is also alleviated by ibuprofen and Tylenol. He has undergone non con CT showing bilateral kidney stones and ultimately was found to have an incidental pancreatic cyst as previously mentioned. Underwent MRCP for further evaluation.    denies any increase in epigastric or subcostal pain following meals. States he is having appropriate  bowel function without issue. And only complains of pain during movement which is worse in the morning.  His Plan: I have personally reviewed the imaging studies and the pancreatic cyst has no high risk features. Therefore, follow-up in 1 year with MRCP for continued surveillance of pancreatic cyst.   Past Medical History:   Diagnosis Date    Allergic conjunctivitis     Allergic rhinitis     Cholelithiasis     Chronic sinusitis     Deviated septum     GERD (gastroesophageal reflux disease)     Hypertension     Hypertrophy of nasal turbinates     Refractory obstruction of nasal airway     Rhinitis medicamentosa      Past Surgical History:   Procedure Laterality Date    ADENOIDECTOMY      COLONOSCOPY  10/24/2013    normal exam excpetion hemorrhoids repeat 5 year    KNEE SURGERY      NECK SURGERY      SHOULDER SURGERY      SINUS SURGERY  2010    kest, FESS, ITR    TONSILLECTOMY         Outpatient Medications Marked as Taking for the 11/2/23 encounter (Office Visit) with Екатерина Carter APRN   Medication Sig Dispense Refill    amlodipine-olmesartan (ANGEL) 10-40 MG per tablet       omeprazole (priLOSEC) 20 MG capsule Take 1 capsule by mouth Daily.      vitamin D (ERGOCALCIFEROL) 1.25 MG (64814 UT) capsule capsule Take 1 capsule by mouth 1 (One) Time Per Week.       No Known Allergies  Social History     Socioeconomic History    Marital status:    Tobacco Use    Smoking status: Never    Smokeless tobacco: Never    Tobacco comments:     Dip daily   Substance and Sexual Activity    Alcohol use: No    Drug use: Never    Sexual activity: Yes     Partners: Female     Birth control/protection: Vasectomy     Family History   Problem Relation Age of Onset    No Known Problems Mother     No Known Problems Father     Colon cancer Neg Hx     Colon polyps Neg Hx      Health Maintenance   Topic Date Due    BMI FOLLOWUP  Never done    COLORECTAL CANCER SCREENING  Never done    COVID-19 Vaccine (1) Never done    ZOSTER  "VACCINE (1 of 2) Never done    TDAP/TD VACCINES (2 - Tdap) 10/04/2015    HEPATITIS C SCREENING  Never done    ANNUAL PHYSICAL  Never done    INFLUENZA VACCINE  Never done    Pneumococcal Vaccine 0-64  Aged Out     Review of Systems   Constitutional:  Negative for activity change, appetite change, chills, diaphoresis, fatigue, fever and unexpected weight change.   HENT:  Negative for ear pain, hearing loss, mouth sores, sore throat, trouble swallowing and voice change.    Eyes: Negative.    Respiratory:  Negative for cough, choking, shortness of breath and wheezing.    Cardiovascular:  Negative for chest pain and palpitations.   Gastrointestinal:  Positive for abdominal pain. Negative for blood in stool, constipation, diarrhea, nausea and vomiting.   Endocrine: Negative for cold intolerance and heat intolerance.   Genitourinary:  Negative for decreased urine volume, dysuria, frequency, hematuria and urgency.   Musculoskeletal:  Negative for back pain, gait problem and myalgias.   Skin:  Negative for color change, pallor and rash.   Allergic/Immunologic: Negative for food allergies and immunocompromised state.   Neurological:  Negative for dizziness, tremors, seizures, syncope, weakness, light-headedness, numbness and headaches.   Hematological:  Negative for adenopathy. Does not bruise/bleed easily.   Psychiatric/Behavioral:  Negative for agitation and confusion. The patient is not nervous/anxious.    All other systems reviewed and are negative.    Objective   Vitals:    11/02/23 1422   BP: 170/100   BP Location: Right arm   Pulse: 77   Temp: 96.4 °F (35.8 °C)   TempSrc: Temporal   SpO2: 95%   Weight: 89.5 kg (197 lb 6.4 oz)   Height: 167.6 cm (65.98\")     Body mass index is 31.88 kg/m².  Physical Exam  Constitutional:       Appearance: He is well-developed.   HENT:      Head: Normocephalic and atraumatic.   Eyes:      Pupils: Pupils are equal, round, and reactive to light.   Neck:      Trachea: No tracheal " deviation.   Cardiovascular:      Rate and Rhythm: Normal rate and regular rhythm.      Heart sounds: Normal heart sounds. No murmur heard.     No friction rub. No gallop.   Pulmonary:      Effort: Pulmonary effort is normal. No respiratory distress.      Breath sounds: Normal breath sounds. No wheezing or rales.   Chest:      Chest wall: No tenderness.   Abdominal:      General: Bowel sounds are normal. There is no distension.      Palpations: Abdomen is soft. Abdomen is not rigid.      Tenderness: There is no abdominal tenderness. There is no guarding or rebound.   Musculoskeletal:         General: No tenderness or deformity. Normal range of motion.      Cervical back: Normal range of motion and neck supple.   Skin:     General: Skin is warm and dry.      Coloration: Skin is not pale.      Findings: No rash.   Neurological:      Mental Status: He is alert and oriented to person, place, and time.      Deep Tendon Reflexes: Reflexes are normal and symmetric.   Psychiatric:         Behavior: Behavior normal.         Thought Content: Thought content normal.         Judgment: Judgment normal.       Assessment & Plan   Diagnoses and all orders for this visit:    1. Epigastric pain (Primary)  -     Case Request; Standing  -     Case Request  -     Comprehensive Metabolic Panel  -     CBC & Differential  -     Sedimentation Rate; Future  -     C-reactive Protein; Future  -     polyethylene glycol (GoLYTELY) 236 g solution; Take as directed by office instructions.  Dispense: 4000 mL; Refill: 0    2. NSAID long-term use    3. Encounter for Hemoccult screening    4. Pancreatic cyst  Comments:  repeat MRCP in 1 year 9/2024 he would like to do here    5. Fatty liver    Other orders  -     Implement Anesthesia Orders Day of Procedure; Standing  -     Obtain Informed Consent; Standing  -     Follow Anesthesia Guidelines / Protocol; Future  -     Obtain Informed Consent; Future  -     Verify Bowel Prep Was Successful;  Standing    I discussed how fatty liver can lead to cirrhosis, disability and pre-mature death.  How it also can be a sign of increased risk for cardiovascular disease.  I discussed the importance of getting rid of fat in the liver by controlling lipids and glucose, avoiding etoh helps, and gradual weight loss to ideal body weight is very important.     Stay on PPI  avoid NSAIDS  He doesn't currently drink ETOH and no significant hx discussed BMI and healthy eating to reduce this risk.       ESOPHAGOGASTRODUODENOSCOPY WITH ANESTHESIA (N/A), COLONOSCOPY WITH ANESTHESIA (N/A)  Part of this note may be an electronic transcription/translation of spoken language to printed text using the Dragon Dictation System.  Body mass index is 31.88 kg/m².  No follow-ups on file.    BMI is >= 30 and <35. (Class 1 Obesity). The following options were offered after discussion;: weight loss educational material (shared in after visit summary)      All risks, benefits, alternatives, and indications of colonoscopy and/or Endoscopy procedure have been discussed with the patient. Risks to include perforation of the colon requiring possible surgery or colostomy, risk of bleeding from biopsies or removal of colon tissue, possibility of missing a colon polyp or cancer, or adverse drug reaction.  Benefits to include the diagnosis and management of disease of the colon and rectum. Alternatives to include barium enema, radiographic evaluation, lab testing or no intervention. Pt verbalizes understanding and agrees.     Екатерина Carter, APRN  11/2/2023  15:28 CDT          If you smoke or use tobacco, 4 minutes reading provided  Steps to Quit Smoking  Smoking tobacco can be harmful to your health and can affect almost every organ in your body. Smoking puts you, and those around you, at risk for developing many serious chronic diseases. Quitting smoking is difficult, but it is one of the best things that you can do for your health. It is never  too late to quit.  What are the benefits of quitting smoking?  When you quit smoking, you lower your risk of developing serious diseases and conditions, such as:  Lung cancer or lung disease, such as COPD.  Heart disease.  Stroke.  Heart attack.  Infertility.  Osteoporosis and bone fractures.  Additionally, symptoms such as coughing, wheezing, and shortness of breath may get better when you quit. You may also find that you get sick less often because your body is stronger at fighting off colds and infections. If you are pregnant, quitting smoking can help to reduce your chances of having a baby of low birth weight.  How do I get ready to quit?  When you decide to quit smoking, create a plan to make sure that you are successful. Before you quit:  Pick a date to quit. Set a date within the next two weeks to give you time to prepare.  Write down the reasons why you are quitting. Keep this list in places where you will see it often, such as on your bathroom mirror or in your car or wallet.  Identify the people, places, things, and activities that make you want to smoke (triggers) and avoid them. Make sure to take these actions:  Throw away all cigarettes at home, at work, and in your car.  Throw away smoking accessories, such as ashtrays and lighters.  Clean your car and make sure to empty the ashtray.  Clean your home, including curtains and carpets.  Tell your family, friends, and coworkers that you are quitting. Support from your loved ones can make quitting easier.  Talk with your health care provider about your options for quitting smoking.  Find out what treatment options are covered by your health insurance.  What strategies can I use to quit smoking?  Talk with your healthcare provider about different strategies to quit smoking. Some strategies include:  Quitting smoking altogether instead of gradually lessening how much you smoke over a period of time. Research shows that quitting “cold turkey” is more  successful than gradually quitting.  Attending in-person counseling to help you build problem-solving skills. You are more likely to have success in quitting if you attend several counseling sessions. Even short sessions of 10 minutes can be effective.  Finding resources and support systems that can help you to quit smoking and remain smoke-free after you quit. These resources are most helpful when you use them often. They can include:  Online chats with a counselor.  Telephone quitlines.  Printed self-help materials.  Support groups or group counseling.  Text messaging programs.  Mobile phone applications.  Taking medicines to help you quit smoking. (If you are pregnant or breastfeeding, talk with your health care provider first.) Some medicines contain nicotine and some do not. Both types of medicines help with cravings, but the medicines that include nicotine help to relieve withdrawal symptoms. Your health care provider may recommend:  Nicotine patches, gum, or lozenges.  Nicotine inhalers or sprays.  Non-nicotine medicine that is taken by mouth.  Talk with your health care provider about combining strategies, such as taking medicines while you are also receiving in-person counseling. Using these two strategies together makes you more likely to succeed in quitting than if you used either strategy on its own.  If you are pregnant or breastfeeding, talk with your health care provider about finding counseling or other support strategies to quit smoking. Do not take medicine to help you quit smoking unless told to do so by your health care provider.  What things can I do to make it easier to quit?  Quitting smoking might feel overwhelming at first, but there is a lot that you can do to make it easier. Take these important actions:  Reach out to your family and friends and ask that they support and encourage you during this time. Call telephone quitlines, reach out to support groups, or work with a counselor for  support.  Ask people who smoke to avoid smoking around you.  Avoid places that trigger you to smoke, such as bars, parties, or smoke-break areas at work.  Spend time around people who do not smoke.  Lessen stress in your life, because stress can be a smoking trigger for some people. To lessen stress, try:  Exercising regularly.  Deep-breathing exercises.  Yoga.  Meditating.  Performing a body scan. This involves closing your eyes, scanning your body from head to toe, and noticing which parts of your body are particularly tense. Purposefully relax the muscles in those areas.  Download or purchase mobile phone or tablet apps (applications) that can help you stick to your quit plan by providing reminders, tips, and encouragement. There are many free apps, such as QuitGuide from the CDC (Centers for Disease Control and Prevention). You can find other support for quitting smoking (smoking cessation) through smokefree.gov and other websites.  How will I feel when I quit smoking?  Within the first 24 hours of quitting smoking, you may start to feel some withdrawal symptoms. These symptoms are usually most noticeable 2-3 days after quitting, but they usually do not last beyond 2-3 weeks. Changes or symptoms that you might experience include:  Mood swings.  Restlessness, anxiety, or irritation.  Difficulty concentrating.  Dizziness.  Strong cravings for sugary foods in addition to nicotine.  Mild weight gain.  Constipation.  Nausea.  Coughing or a sore throat.  Changes in how your medicines work in your body.  A depressed mood.  Difficulty sleeping (insomnia).  After the first 2-3 weeks of quitting, you may start to notice more positive results, such as:  Improved sense of smell and taste.  Decreased coughing and sore throat.  Slower heart rate.  Lower blood pressure.  Clearer skin.  The ability to breathe more easily.  Fewer sick days.  Quitting smoking is very challenging for most people. Do not get discouraged if you are  not successful the first time. Some people need to make many attempts to quit before they achieve long-term success. Do your best to stick to your quit plan, and talk with your health care provider if you have any questions or concerns.  This information is not intended to replace advice given to you by your health care provider. Make sure you discuss any questions you have with your health care provider.  Document Released: 12/12/2002 Document Revised: 08/15/2017 Document Reviewed: 05/03/2016  Elsevier Interactive Patient Education © 2017 [employer]

## 2023-11-03 PROBLEM — R10.13 EPIGASTRIC PAIN: Status: ACTIVE | Noted: 2023-11-02

## 2023-11-03 LAB
CRP SERPL-MCNC: 4.17 MG/DL (ref 0–0.5)
ERYTHROCYTE [SEDIMENTATION RATE] IN BLOOD: 3 MM/HR (ref 0–20)

## 2023-11-09 ENCOUNTER — ANESTHESIA EVENT (OUTPATIENT)
Dept: GASTROENTEROLOGY | Facility: HOSPITAL | Age: 58
End: 2023-11-09
Payer: COMMERCIAL

## 2023-11-09 ENCOUNTER — HOSPITAL ENCOUNTER (OUTPATIENT)
Facility: HOSPITAL | Age: 58
Setting detail: HOSPITAL OUTPATIENT SURGERY
Discharge: HOME | End: 2023-11-09
Attending: INTERNAL MEDICINE | Admitting: INTERNAL MEDICINE
Payer: COMMERCIAL

## 2023-11-09 ENCOUNTER — ANESTHESIA (OUTPATIENT)
Dept: GASTROENTEROLOGY | Facility: HOSPITAL | Age: 58
End: 2023-11-09
Payer: COMMERCIAL

## 2023-11-09 VITALS
TEMPERATURE: 97 F | OXYGEN SATURATION: 95 % | WEIGHT: 190 LBS | RESPIRATION RATE: 14 BRPM | DIASTOLIC BLOOD PRESSURE: 77 MMHG | SYSTOLIC BLOOD PRESSURE: 106 MMHG | BODY MASS INDEX: 30.53 KG/M2 | HEART RATE: 92 BPM | HEIGHT: 66 IN

## 2023-11-09 DIAGNOSIS — R10.13 EPIGASTRIC PAIN: ICD-10-CM

## 2023-11-09 PROCEDURE — 45385 COLONOSCOPY W/LESION REMOVAL: CPT | Performed by: INTERNAL MEDICINE

## 2023-11-09 PROCEDURE — 43239 EGD BIOPSY SINGLE/MULTIPLE: CPT | Performed by: INTERNAL MEDICINE

## 2023-11-09 PROCEDURE — 87081 CULTURE SCREEN ONLY: CPT | Performed by: INTERNAL MEDICINE

## 2023-11-09 PROCEDURE — 88305 TISSUE EXAM BY PATHOLOGIST: CPT | Performed by: INTERNAL MEDICINE

## 2023-11-09 PROCEDURE — 25010000002 PROPOFOL 10 MG/ML EMULSION: Performed by: NURSE ANESTHETIST, CERTIFIED REGISTERED

## 2023-11-09 PROCEDURE — 25810000003 SODIUM CHLORIDE 0.9 % SOLUTION: Performed by: NURSE ANESTHETIST, CERTIFIED REGISTERED

## 2023-11-09 RX ORDER — SODIUM CHLORIDE 9 MG/ML
500 INJECTION, SOLUTION INTRAVENOUS CONTINUOUS PRN
Status: DISCONTINUED | OUTPATIENT
Start: 2023-11-09 | End: 2023-11-09 | Stop reason: HOSPADM

## 2023-11-09 RX ORDER — PREDNISONE 1 MG/1
10 TABLET ORAL DAILY
Status: ON HOLD | COMMUNITY

## 2023-11-09 RX ORDER — LIDOCAINE HYDROCHLORIDE 20 MG/ML
INJECTION, SOLUTION EPIDURAL; INFILTRATION; INTRACAUDAL; PERINEURAL AS NEEDED
Status: DISCONTINUED | OUTPATIENT
Start: 2023-11-09 | End: 2023-11-09 | Stop reason: SURG

## 2023-11-09 RX ORDER — PROPOFOL 10 MG/ML
VIAL (ML) INTRAVENOUS AS NEEDED
Status: DISCONTINUED | OUTPATIENT
Start: 2023-11-09 | End: 2023-11-09 | Stop reason: SURG

## 2023-11-09 RX ORDER — SODIUM CHLORIDE 0.9 % (FLUSH) 0.9 %
10 SYRINGE (ML) INJECTION AS NEEDED
Status: DISCONTINUED | OUTPATIENT
Start: 2023-11-09 | End: 2023-11-09 | Stop reason: HOSPADM

## 2023-11-09 RX ADMIN — LIDOCAINE HYDROCHLORIDE 160 MG: 20 INJECTION, SOLUTION EPIDURAL; INFILTRATION; INTRACAUDAL; PERINEURAL at 08:21

## 2023-11-09 RX ADMIN — PROPOFOL INJECTABLE EMULSION 640 MG: 10 INJECTION, EMULSION INTRAVENOUS at 08:21

## 2023-11-09 RX ADMIN — SODIUM CHLORIDE 500 ML: 9 INJECTION, SOLUTION INTRAVENOUS at 07:50

## 2023-11-09 RX ADMIN — GLYCOPYRROLATE 0.1 MG: 0.2 INJECTION INTRAMUSCULAR; INTRAVENOUS at 08:19

## 2023-11-09 NOTE — ANESTHESIA PREPROCEDURE EVALUATION
Anesthesia Evaluation     history of anesthetic complications:  PONV  NPO Solid Status: > 8 hours  NPO Liquid Status: > 2 hours           Airway   Mallampati: I  TM distance: >3 FB  Neck ROM: full  No difficulty expected  Dental      Pulmonary    (-) sleep apnea, not a smoker  Cardiovascular   Exercise tolerance: poor (<4 METS)    (+) hypertension  (-) CAD      Neuro/Psych  GI/Hepatic/Renal/Endo    (+) GERD  (-) liver disease, no renal disease, diabetes    ROS Comment: Pt reports midabdominal/chest pain that radiates to back   MRi pancreas  - Cystic structure in the pancreatic body measuring up to 1.1 cm.  This may represent retention cyst, pseudocyst or side branch IPMN.  The main pancreatic duct is not dilated.  Continued follow-up recommended.   - Probable pancreatic divisum.  No acute or chronic pancreatitis.   - Possible mild hepatic steatosis.  Correlation with LFTs recommended.       Musculoskeletal     Abdominal    Substance History      OB/GYN          Other                      Anesthesia Plan    ASA 2     MAC     (Pt reports ongoing weakness, having extensive w/u including mri back done yesterday  Difficulty walking due to weakness  Having egd/colon to eval for source of inflammation)  intravenous induction     Anesthetic plan, risks, benefits, and alternatives have been provided, discussed and informed consent has been obtained with: patient.      CODE STATUS:

## 2023-11-09 NOTE — ANESTHESIA POSTPROCEDURE EVALUATION
"Patient: Cecil Saucedo    Procedure Summary       Date: 11/09/23 Room / Location: [facility] ENDOSCOPY 4 / [facility] END[facility]COPY    Anesthesia Start: 0819 Anesthesia Stop: 0851    Procedures:       ESOPHAGOGASTRODUODENOSCOPY WITH ANESTHESIA      COLONOSCOPY WITH ANESTHESIA Diagnosis:       Epigastric pain      (Epigastric pain [R10.13])    Surgeons: Ian Woodard MD Provider: Calvin Ybarra CRNA    Anesthesia Type: MAC ASA Status: 2            Anesthesia Type: MAC    Vitals  Vitals Value Taken Time   BP     Temp     Pulse 94 11/09/23 0851   Resp     SpO2 94 % 11/09/23 0851   Vitals shown include unfiled device data.        Post Anesthesia Care and Evaluation    Patient location during evaluation: PHASE II  Patient participation: complete - patient participated  Level of consciousness: awake and alert  Pain score: 0  Pain management: adequate    Airway patency: patent  Anesthetic complications: No anesthetic complications  PONV Status: none  Cardiovascular status: acceptable  Respiratory status: acceptable  Hydration status: acceptable    Comments: Blood pressure 155/85, pulse 79, temperature 97 °F (36.1 °C), resp. rate 18, height 167.6 cm (66\"), weight 86.2 kg (190 lb), SpO2 95%.    Pt discharged from PACU based on augustine score >8    "

## 2023-11-10 LAB
CYTO UR: NORMAL
LAB AP CASE REPORT: NORMAL
Lab: NORMAL
PATH REPORT.FINAL DX SPEC: NORMAL
PATH REPORT.GROSS SPEC: NORMAL
UREASE TISS QL: NEGATIVE

## 2023-11-12 ENCOUNTER — HOSPITAL ENCOUNTER (INPATIENT)
Facility: HOSPITAL | Age: 58
LOS: 2 days | Discharge: HOME | DRG: 660 | End: 2023-11-14
Attending: EMERGENCY MEDICINE | Admitting: FAMILY MEDICINE
Payer: COMMERCIAL

## 2023-11-12 ENCOUNTER — APPOINTMENT (OUTPATIENT)
Dept: CT IMAGING | Facility: HOSPITAL | Age: 58
DRG: 660 | End: 2023-11-12
Payer: COMMERCIAL

## 2023-11-12 DIAGNOSIS — N17.9 ACUTE KIDNEY INJURY: Primary | ICD-10-CM

## 2023-11-12 DIAGNOSIS — N23 URETERAL COLIC: ICD-10-CM

## 2023-11-12 LAB
ALBUMIN SERPL-MCNC: 4.2 G/DL (ref 3.5–5.2)
ALBUMIN/GLOB SERPL: 1.5 G/DL
ALP SERPL-CCNC: 61 U/L (ref 39–117)
ALT SERPL W P-5'-P-CCNC: 20 U/L (ref 1–41)
ANION GAP SERPL CALCULATED.3IONS-SCNC: 11 MMOL/L (ref 5–15)
AST SERPL-CCNC: 15 U/L (ref 1–40)
BACTERIA UR QL AUTO: ABNORMAL /HPF
BASOPHILS # BLD AUTO: 0.12 10*3/MM3 (ref 0–0.2)
BASOPHILS NFR BLD AUTO: 0.7 % (ref 0–1.5)
BILIRUB SERPL-MCNC: 0.3 MG/DL (ref 0–1.2)
BILIRUB UR QL STRIP: ABNORMAL
BUN SERPL-MCNC: 24 MG/DL (ref 8–23)
BUN/CREAT SERPL: 10.3 (ref 7–25)
CALCIUM SPEC-SCNC: 9.8 MG/DL (ref 8.6–10.5)
CHLORIDE SERPL-SCNC: 104 MMOL/L (ref 98–107)
CLARITY UR: CLEAR
CO2 SERPL-SCNC: 26 MMOL/L (ref 22–29)
COD CRY URNS QL: ABNORMAL /HPF
COLOR UR: ABNORMAL
CREAT SERPL-MCNC: 2.32 MG/DL (ref 0.76–1.27)
DEPRECATED RDW RBC AUTO: 46.8 FL (ref 37–54)
EGFRCR SERPLBLD CKD-EPI 2021: 31.4 ML/MIN/1.73
EOSINOPHIL # BLD AUTO: 0.08 10*3/MM3 (ref 0–0.4)
EOSINOPHIL NFR BLD AUTO: 0.5 % (ref 0.3–6.2)
ERYTHROCYTE [DISTWIDTH] IN BLOOD BY AUTOMATED COUNT: 13.9 % (ref 12.3–15.4)
GLOBULIN UR ELPH-MCNC: 2.8 GM/DL
GLUCOSE SERPL-MCNC: 113 MG/DL (ref 65–99)
GLUCOSE UR STRIP-MCNC: NEGATIVE MG/DL
HCT VFR BLD AUTO: 45.7 % (ref 37.5–51)
HGB BLD-MCNC: 15.3 G/DL (ref 13–17.7)
HGB UR QL STRIP.AUTO: ABNORMAL
HYALINE CASTS UR QL AUTO: ABNORMAL /LPF
IMM GRANULOCYTES # BLD AUTO: 0.42 10*3/MM3 (ref 0–0.05)
IMM GRANULOCYTES NFR BLD AUTO: 2.5 % (ref 0–0.5)
INR PPP: 0.98 (ref 0.91–1.09)
KETONES UR QL STRIP: ABNORMAL
LEUKOCYTE ESTERASE UR QL STRIP.AUTO: ABNORMAL
LIPASE SERPL-CCNC: 51 U/L (ref 13–60)
LYMPHOCYTES # BLD AUTO: 1.18 10*3/MM3 (ref 0.7–3.1)
LYMPHOCYTES NFR BLD AUTO: 7.1 % (ref 19.6–45.3)
MCH RBC QN AUTO: 30.6 PG (ref 26.6–33)
MCHC RBC AUTO-ENTMCNC: 33.5 G/DL (ref 31.5–35.7)
MCV RBC AUTO: 91.4 FL (ref 79–97)
MONOCYTES # BLD AUTO: 1.25 10*3/MM3 (ref 0.1–0.9)
MONOCYTES NFR BLD AUTO: 7.5 % (ref 5–12)
MUCOUS THREADS URNS QL MICRO: ABNORMAL /HPF
NEUTROPHILS NFR BLD AUTO: 13.55 10*3/MM3 (ref 1.7–7)
NEUTROPHILS NFR BLD AUTO: 81.7 % (ref 42.7–76)
NITRITE UR QL STRIP: NEGATIVE
NRBC BLD AUTO-RTO: 0 /100 WBC (ref 0–0.2)
PH UR STRIP.AUTO: 5.5 [PH] (ref 5–8)
PLATELET # BLD AUTO: 354 10*3/MM3 (ref 140–450)
PMV BLD AUTO: 8.8 FL (ref 6–12)
POTASSIUM SERPL-SCNC: 3.9 MMOL/L (ref 3.5–5.2)
PROT SERPL-MCNC: 7 G/DL (ref 6–8.5)
PROT UR QL STRIP: ABNORMAL
PROTHROMBIN TIME: 13 SECONDS (ref 11.8–14.8)
RBC # BLD AUTO: 5 10*6/MM3 (ref 4.14–5.8)
RBC # UR STRIP: ABNORMAL /HPF
REF LAB TEST METHOD: ABNORMAL
SODIUM SERPL-SCNC: 141 MMOL/L (ref 136–145)
SP GR UR STRIP: >1.03 (ref 1–1.03)
SQUAMOUS #/AREA URNS HPF: ABNORMAL /HPF
UROBILINOGEN UR QL STRIP: ABNORMAL
WBC # UR STRIP: ABNORMAL /HPF
WBC NRBC COR # BLD: 16.6 10*3/MM3 (ref 3.4–10.8)

## 2023-11-12 PROCEDURE — 81001 URINALYSIS AUTO W/SCOPE: CPT | Performed by: EMERGENCY MEDICINE

## 2023-11-12 PROCEDURE — 85610 PROTHROMBIN TIME: CPT | Performed by: EMERGENCY MEDICINE

## 2023-11-12 PROCEDURE — 74176 CT ABD & PELVIS W/O CONTRAST: CPT

## 2023-11-12 PROCEDURE — 25810000003 LACTATED RINGERS SOLUTION: Performed by: EMERGENCY MEDICINE

## 2023-11-12 PROCEDURE — 25810000003 LACTATED RINGERS PER 1000 ML: Performed by: INTERNAL MEDICINE

## 2023-11-12 PROCEDURE — 85025 COMPLETE CBC W/AUTO DIFF WBC: CPT | Performed by: EMERGENCY MEDICINE

## 2023-11-12 PROCEDURE — 99285 EMERGENCY DEPT VISIT HI MDM: CPT

## 2023-11-12 PROCEDURE — 25010000002 HYDROMORPHONE PER 4 MG: Performed by: EMERGENCY MEDICINE

## 2023-11-12 PROCEDURE — 80053 COMPREHEN METABOLIC PANEL: CPT | Performed by: EMERGENCY MEDICINE

## 2023-11-12 PROCEDURE — 83690 ASSAY OF LIPASE: CPT | Performed by: EMERGENCY MEDICINE

## 2023-11-12 PROCEDURE — 25010000002 ONDANSETRON PER 1 MG: Performed by: EMERGENCY MEDICINE

## 2023-11-12 RX ORDER — ONDANSETRON 2 MG/ML
4 INJECTION INTRAMUSCULAR; INTRAVENOUS EVERY 6 HOURS PRN
Status: DISCONTINUED | OUTPATIENT
Start: 2023-11-12 | End: 2023-11-14 | Stop reason: HOSPADM

## 2023-11-12 RX ORDER — SODIUM CHLORIDE 0.9 % (FLUSH) 0.9 %
10 SYRINGE (ML) INJECTION AS NEEDED
Status: DISCONTINUED | OUTPATIENT
Start: 2023-11-12 | End: 2023-11-14 | Stop reason: HOSPADM

## 2023-11-12 RX ORDER — TAMSULOSIN HYDROCHLORIDE 0.4 MG/1
0.4 CAPSULE ORAL 2 TIMES DAILY
Status: DISCONTINUED | OUTPATIENT
Start: 2023-11-12 | End: 2023-11-14 | Stop reason: HOSPADM

## 2023-11-12 RX ORDER — OXYCODONE HYDROCHLORIDE AND ACETAMINOPHEN 5; 325 MG/1; MG/1
1 TABLET ORAL EVERY 8 HOURS PRN
Status: DISCONTINUED | OUTPATIENT
Start: 2023-11-12 | End: 2023-11-14 | Stop reason: HOSPADM

## 2023-11-12 RX ORDER — LOSARTAN POTASSIUM 50 MG/1
100 TABLET ORAL
Status: DISCONTINUED | OUTPATIENT
Start: 2023-11-13 | End: 2023-11-14 | Stop reason: HOSPADM

## 2023-11-12 RX ORDER — SODIUM CHLORIDE 0.9 % (FLUSH) 0.9 %
10 SYRINGE (ML) INJECTION EVERY 12 HOURS SCHEDULED
Status: DISCONTINUED | OUTPATIENT
Start: 2023-11-12 | End: 2023-11-14 | Stop reason: HOSPADM

## 2023-11-12 RX ORDER — HYDROMORPHONE HYDROCHLORIDE 1 MG/ML
0.5 INJECTION, SOLUTION INTRAMUSCULAR; INTRAVENOUS; SUBCUTANEOUS
Status: DISCONTINUED | OUTPATIENT
Start: 2023-11-12 | End: 2023-11-14 | Stop reason: HOSPADM

## 2023-11-12 RX ORDER — GABAPENTIN 100 MG/1
100 CAPSULE ORAL 3 TIMES DAILY
COMMUNITY
Start: 2023-11-03

## 2023-11-12 RX ORDER — PREDNISONE 10 MG/1
10 TABLET ORAL DAILY
Status: DISCONTINUED | OUTPATIENT
Start: 2023-11-13 | End: 2023-11-14 | Stop reason: HOSPADM

## 2023-11-12 RX ORDER — SODIUM CHLORIDE 9 MG/ML
40 INJECTION, SOLUTION INTRAVENOUS AS NEEDED
Status: DISCONTINUED | OUTPATIENT
Start: 2023-11-12 | End: 2023-11-14 | Stop reason: HOSPADM

## 2023-11-12 RX ORDER — HYDROMORPHONE HYDROCHLORIDE 1 MG/ML
0.5 INJECTION, SOLUTION INTRAMUSCULAR; INTRAVENOUS; SUBCUTANEOUS ONCE
Status: COMPLETED | OUTPATIENT
Start: 2023-11-12 | End: 2023-11-12

## 2023-11-12 RX ORDER — SODIUM CHLORIDE, SODIUM LACTATE, POTASSIUM CHLORIDE, CALCIUM CHLORIDE 600; 310; 30; 20 MG/100ML; MG/100ML; MG/100ML; MG/100ML
100 INJECTION, SOLUTION INTRAVENOUS CONTINUOUS
Status: DISCONTINUED | OUTPATIENT
Start: 2023-11-12 | End: 2023-11-14 | Stop reason: HOSPADM

## 2023-11-12 RX ORDER — GABAPENTIN 100 MG/1
100 CAPSULE ORAL 3 TIMES DAILY
Status: DISCONTINUED | OUTPATIENT
Start: 2023-11-12 | End: 2023-11-14 | Stop reason: HOSPADM

## 2023-11-12 RX ORDER — PANTOPRAZOLE SODIUM 40 MG/1
40 TABLET, DELAYED RELEASE ORAL
Status: DISCONTINUED | OUTPATIENT
Start: 2023-11-13 | End: 2023-11-14 | Stop reason: HOSPADM

## 2023-11-12 RX ORDER — ONDANSETRON 2 MG/ML
4 INJECTION INTRAMUSCULAR; INTRAVENOUS ONCE
Status: COMPLETED | OUTPATIENT
Start: 2023-11-12 | End: 2023-11-12

## 2023-11-12 RX ORDER — ACETAMINOPHEN 325 MG/1
650 TABLET ORAL EVERY 4 HOURS PRN
Status: DISCONTINUED | OUTPATIENT
Start: 2023-11-12 | End: 2023-11-14 | Stop reason: HOSPADM

## 2023-11-12 RX ORDER — AMLODIPINE BESYLATE 10 MG/1
10 TABLET ORAL
Status: DISCONTINUED | OUTPATIENT
Start: 2023-11-13 | End: 2023-11-14 | Stop reason: HOSPADM

## 2023-11-12 RX ADMIN — ONDANSETRON 4 MG: 2 INJECTION INTRAMUSCULAR; INTRAVENOUS at 18:55

## 2023-11-12 RX ADMIN — Medication 10 ML: at 21:20

## 2023-11-12 RX ADMIN — TAMSULOSIN HYDROCHLORIDE 0.4 MG: 0.4 CAPSULE ORAL at 21:11

## 2023-11-12 RX ADMIN — HYDROMORPHONE HYDROCHLORIDE 0.5 MG: 1 INJECTION, SOLUTION INTRAMUSCULAR; INTRAVENOUS; SUBCUTANEOUS at 18:55

## 2023-11-12 RX ADMIN — GABAPENTIN 100 MG: 100 CAPSULE ORAL at 21:11

## 2023-11-12 RX ADMIN — SODIUM CHLORIDE, POTASSIUM CHLORIDE, SODIUM LACTATE AND CALCIUM CHLORIDE 1000 ML: 600; 310; 30; 20 INJECTION, SOLUTION INTRAVENOUS at 19:24

## 2023-11-12 RX ADMIN — SODIUM CHLORIDE, POTASSIUM CHLORIDE, SODIUM LACTATE AND CALCIUM CHLORIDE 100 ML/HR: 600; 310; 30; 20 INJECTION, SOLUTION INTRAVENOUS at 21:11

## 2023-11-13 ENCOUNTER — ANESTHESIA EVENT (OUTPATIENT)
Dept: PERIOP | Facility: HOSPITAL | Age: 58
DRG: 660 | End: 2023-11-13
Payer: COMMERCIAL

## 2023-11-13 ENCOUNTER — APPOINTMENT (OUTPATIENT)
Dept: GENERAL RADIOLOGY | Facility: HOSPITAL | Age: 58
DRG: 660 | End: 2023-11-13
Payer: COMMERCIAL

## 2023-11-13 ENCOUNTER — ANESTHESIA (OUTPATIENT)
Dept: PERIOP | Facility: HOSPITAL | Age: 58
DRG: 660 | End: 2023-11-13
Payer: COMMERCIAL

## 2023-11-13 PROBLEM — N20.1 RIGHT URETERAL STONE: Status: ACTIVE | Noted: 2023-11-13

## 2023-11-13 LAB
ALBUMIN SERPL-MCNC: 3.2 G/DL (ref 3.5–5.2)
ALBUMIN/GLOB SERPL: 1.3 G/DL
ALP SERPL-CCNC: 51 U/L (ref 39–117)
ALT SERPL W P-5'-P-CCNC: 16 U/L (ref 1–41)
ANION GAP SERPL CALCULATED.3IONS-SCNC: 8 MMOL/L (ref 5–15)
AST SERPL-CCNC: 11 U/L (ref 1–40)
BASOPHILS # BLD AUTO: 0.07 10*3/MM3 (ref 0–0.2)
BASOPHILS NFR BLD AUTO: 0.6 % (ref 0–1.5)
BILIRUB SERPL-MCNC: 0.3 MG/DL (ref 0–1.2)
BUN SERPL-MCNC: 22 MG/DL (ref 8–23)
BUN/CREAT SERPL: 16.3 (ref 7–25)
CALCIUM SPEC-SCNC: 9.5 MG/DL (ref 8.6–10.5)
CHLORIDE SERPL-SCNC: 107 MMOL/L (ref 98–107)
CO2 SERPL-SCNC: 28 MMOL/L (ref 22–29)
CREAT SERPL-MCNC: 1.35 MG/DL (ref 0.76–1.27)
DEPRECATED RDW RBC AUTO: 46.7 FL (ref 37–54)
EGFRCR SERPLBLD CKD-EPI 2021: 60.1 ML/MIN/1.73
EOSINOPHIL # BLD AUTO: 0.24 10*3/MM3 (ref 0–0.4)
EOSINOPHIL NFR BLD AUTO: 1.9 % (ref 0.3–6.2)
ERYTHROCYTE [DISTWIDTH] IN BLOOD BY AUTOMATED COUNT: 13.7 % (ref 12.3–15.4)
GLOBULIN UR ELPH-MCNC: 2.5 GM/DL
GLUCOSE SERPL-MCNC: 103 MG/DL (ref 65–99)
HCT VFR BLD AUTO: 39.6 % (ref 37.5–51)
HGB BLD-MCNC: 13 G/DL (ref 13–17.7)
IMM GRANULOCYTES # BLD AUTO: 0.29 10*3/MM3 (ref 0–0.05)
IMM GRANULOCYTES NFR BLD AUTO: 2.3 % (ref 0–0.5)
LYMPHOCYTES # BLD AUTO: 2.36 10*3/MM3 (ref 0.7–3.1)
LYMPHOCYTES NFR BLD AUTO: 18.9 % (ref 19.6–45.3)
MCH RBC QN AUTO: 30.5 PG (ref 26.6–33)
MCHC RBC AUTO-ENTMCNC: 32.8 G/DL (ref 31.5–35.7)
MCV RBC AUTO: 93 FL (ref 79–97)
MONOCYTES # BLD AUTO: 1.25 10*3/MM3 (ref 0.1–0.9)
MONOCYTES NFR BLD AUTO: 10 % (ref 5–12)
NEUTROPHILS NFR BLD AUTO: 66.3 % (ref 42.7–76)
NEUTROPHILS NFR BLD AUTO: 8.27 10*3/MM3 (ref 1.7–7)
NRBC BLD AUTO-RTO: 0 /100 WBC (ref 0–0.2)
PLATELET # BLD AUTO: 307 10*3/MM3 (ref 140–450)
PMV BLD AUTO: 8.8 FL (ref 6–12)
POTASSIUM SERPL-SCNC: 4.3 MMOL/L (ref 3.5–5.2)
PROT SERPL-MCNC: 5.7 G/DL (ref 6–8.5)
RBC # BLD AUTO: 4.26 10*6/MM3 (ref 4.14–5.8)
SODIUM SERPL-SCNC: 143 MMOL/L (ref 136–145)
WBC NRBC COR # BLD: 12.48 10*3/MM3 (ref 3.4–10.8)

## 2023-11-13 PROCEDURE — 80053 COMPREHEN METABOLIC PANEL: CPT | Performed by: INTERNAL MEDICINE

## 2023-11-13 PROCEDURE — C1758 CATHETER, URETERAL: HCPCS | Performed by: UROLOGY

## 2023-11-13 PROCEDURE — 25010000002 FENTANYL CITRATE (PF) 100 MCG/2ML SOLUTION: Performed by: NURSE ANESTHETIST, CERTIFIED REGISTERED

## 2023-11-13 PROCEDURE — 63710000001 PREDNISONE PER 5 MG: Performed by: UROLOGY

## 2023-11-13 PROCEDURE — 25010000002 ONDANSETRON PER 1 MG: Performed by: NURSE ANESTHETIST, CERTIFIED REGISTERED

## 2023-11-13 PROCEDURE — 25010000002 PROPOFOL 10 MG/ML EMULSION: Performed by: NURSE ANESTHETIST, CERTIFIED REGISTERED

## 2023-11-13 PROCEDURE — 74420 UROGRAPHY RTRGR +-KUB: CPT

## 2023-11-13 PROCEDURE — C2617 STENT, NON-COR, TEM W/O DEL: HCPCS | Performed by: UROLOGY

## 2023-11-13 PROCEDURE — 25810000003 LACTATED RINGERS PER 1000 ML: Performed by: UROLOGY

## 2023-11-13 PROCEDURE — 25010000002 DEXAMETHASONE PER 1 MG: Performed by: ANESTHESIOLOGY

## 2023-11-13 PROCEDURE — 25510000001 IOPAMIDOL 61 % SOLUTION: Performed by: UROLOGY

## 2023-11-13 PROCEDURE — C1769 GUIDE WIRE: HCPCS | Performed by: UROLOGY

## 2023-11-13 PROCEDURE — 85025 COMPLETE CBC W/AUTO DIFF WBC: CPT | Performed by: INTERNAL MEDICINE

## 2023-11-13 PROCEDURE — 99223 1ST HOSP IP/OBS HIGH 75: CPT | Performed by: UROLOGY

## 2023-11-13 PROCEDURE — 52351 CYSTOURETERO & OR PYELOSCOPE: CPT | Performed by: UROLOGY

## 2023-11-13 PROCEDURE — BT141ZZ FLUOROSCOPY OF KIDNEYS, URETERS AND BLADDER USING LOW OSMOLAR CONTRAST: ICD-10-PCS | Performed by: UROLOGY

## 2023-11-13 PROCEDURE — 25810000003 LACTATED RINGERS PER 1000 ML: Performed by: ANESTHESIOLOGY

## 2023-11-13 PROCEDURE — 52332 CYSTOSCOPY AND TREATMENT: CPT | Performed by: UROLOGY

## 2023-11-13 PROCEDURE — 0T768DZ DILATION OF RIGHT URETER WITH INTRALUMINAL DEVICE, VIA NATURAL OR ARTIFICIAL OPENING ENDOSCOPIC: ICD-10-PCS | Performed by: UROLOGY

## 2023-11-13 DEVICE — URETERAL STENT WITH SIDE HOLES 6FX24CM
Type: IMPLANTABLE DEVICE | Site: URETER | Status: FUNCTIONAL
Brand: TRIA™ SOFT

## 2023-11-13 RX ORDER — HYDROCODONE BITARTRATE AND ACETAMINOPHEN 5; 325 MG/1; MG/1
1 TABLET ORAL ONCE AS NEEDED
Status: DISCONTINUED | OUTPATIENT
Start: 2023-11-13 | End: 2023-11-13

## 2023-11-13 RX ORDER — PHENAZOPYRIDINE HYDROCHLORIDE 200 MG/1
200 TABLET, FILM COATED ORAL
Status: DISCONTINUED | OUTPATIENT
Start: 2023-11-13 | End: 2023-11-14 | Stop reason: HOSPADM

## 2023-11-13 RX ORDER — ONDANSETRON 2 MG/ML
INJECTION INTRAMUSCULAR; INTRAVENOUS AS NEEDED
Status: DISCONTINUED | OUTPATIENT
Start: 2023-11-13 | End: 2023-11-13 | Stop reason: SURG

## 2023-11-13 RX ORDER — HYDROCODONE BITARTRATE AND ACETAMINOPHEN 10; 325 MG/1; MG/1
1 TABLET ORAL EVERY 4 HOURS PRN
Status: DISCONTINUED | OUTPATIENT
Start: 2023-11-13 | End: 2023-11-13

## 2023-11-13 RX ORDER — FENTANYL CITRATE 50 UG/ML
INJECTION, SOLUTION INTRAMUSCULAR; INTRAVENOUS AS NEEDED
Status: DISCONTINUED | OUTPATIENT
Start: 2023-11-13 | End: 2023-11-13 | Stop reason: SURG

## 2023-11-13 RX ORDER — DEXAMETHASONE SODIUM PHOSPHATE 4 MG/ML
4 INJECTION, SOLUTION INTRA-ARTICULAR; INTRALESIONAL; INTRAMUSCULAR; INTRAVENOUS; SOFT TISSUE ONCE AS NEEDED
Status: COMPLETED | OUTPATIENT
Start: 2023-11-13 | End: 2023-11-13

## 2023-11-13 RX ORDER — OMEPRAZOLE 40 MG/1
40 CAPSULE, DELAYED RELEASE ORAL DAILY
COMMUNITY

## 2023-11-13 RX ORDER — NALOXONE HCL 0.4 MG/ML
0.4 VIAL (ML) INJECTION AS NEEDED
Status: DISCONTINUED | OUTPATIENT
Start: 2023-11-13 | End: 2023-11-13

## 2023-11-13 RX ORDER — MAGNESIUM HYDROXIDE 1200 MG/15ML
LIQUID ORAL AS NEEDED
Status: DISCONTINUED | OUTPATIENT
Start: 2023-11-13 | End: 2023-11-13 | Stop reason: HOSPADM

## 2023-11-13 RX ORDER — DROPERIDOL 2.5 MG/ML
0.62 INJECTION, SOLUTION INTRAMUSCULAR; INTRAVENOUS ONCE AS NEEDED
Status: DISCONTINUED | OUTPATIENT
Start: 2023-11-13 | End: 2023-11-13

## 2023-11-13 RX ORDER — ONDANSETRON 2 MG/ML
4 INJECTION INTRAMUSCULAR; INTRAVENOUS ONCE AS NEEDED
Status: DISCONTINUED | OUTPATIENT
Start: 2023-11-13 | End: 2023-11-13

## 2023-11-13 RX ORDER — MIDAZOLAM HYDROCHLORIDE 1 MG/ML
2 INJECTION INTRAMUSCULAR; INTRAVENOUS
Status: DISCONTINUED | OUTPATIENT
Start: 2023-11-13 | End: 2023-11-13 | Stop reason: HOSPADM

## 2023-11-13 RX ORDER — SCOLOPAMINE TRANSDERMAL SYSTEM 1 MG/1
1 PATCH, EXTENDED RELEASE TRANSDERMAL ONCE
Status: DISCONTINUED | OUTPATIENT
Start: 2023-11-13 | End: 2023-11-13

## 2023-11-13 RX ORDER — FENTANYL CITRATE 50 UG/ML
25 INJECTION, SOLUTION INTRAMUSCULAR; INTRAVENOUS
Status: DISCONTINUED | OUTPATIENT
Start: 2023-11-13 | End: 2023-11-13

## 2023-11-13 RX ORDER — BUPIVACAINE HCL/0.9 % NACL/PF 0.125 %
PLASTIC BAG, INJECTION (ML) EPIDURAL AS NEEDED
Status: DISCONTINUED | OUTPATIENT
Start: 2023-11-13 | End: 2023-11-13 | Stop reason: SURG

## 2023-11-13 RX ORDER — SODIUM CHLORIDE 9 MG/ML
40 INJECTION, SOLUTION INTRAVENOUS AS NEEDED
Status: DISCONTINUED | OUTPATIENT
Start: 2023-11-13 | End: 2023-11-13 | Stop reason: HOSPADM

## 2023-11-13 RX ORDER — ACETAMINOPHEN 500 MG
1000 TABLET ORAL ONCE
Status: COMPLETED | OUTPATIENT
Start: 2023-11-13 | End: 2023-11-13

## 2023-11-13 RX ORDER — PROPOFOL 10 MG/ML
VIAL (ML) INTRAVENOUS AS NEEDED
Status: DISCONTINUED | OUTPATIENT
Start: 2023-11-13 | End: 2023-11-13 | Stop reason: SURG

## 2023-11-13 RX ORDER — SODIUM CHLORIDE, SODIUM LACTATE, POTASSIUM CHLORIDE, CALCIUM CHLORIDE 600; 310; 30; 20 MG/100ML; MG/100ML; MG/100ML; MG/100ML
100 INJECTION, SOLUTION INTRAVENOUS CONTINUOUS
Status: DISCONTINUED | OUTPATIENT
Start: 2023-11-13 | End: 2023-11-13

## 2023-11-13 RX ORDER — SODIUM CHLORIDE 0.9 % (FLUSH) 0.9 %
3-10 SYRINGE (ML) INJECTION AS NEEDED
Status: DISCONTINUED | OUTPATIENT
Start: 2023-11-13 | End: 2023-11-13 | Stop reason: HOSPADM

## 2023-11-13 RX ORDER — SODIUM CHLORIDE 0.9 % (FLUSH) 0.9 %
3 SYRINGE (ML) INJECTION EVERY 12 HOURS SCHEDULED
Status: DISCONTINUED | OUTPATIENT
Start: 2023-11-13 | End: 2023-11-13 | Stop reason: HOSPADM

## 2023-11-13 RX ORDER — LABETALOL HYDROCHLORIDE 5 MG/ML
5 INJECTION, SOLUTION INTRAVENOUS
Status: DISCONTINUED | OUTPATIENT
Start: 2023-11-13 | End: 2023-11-13

## 2023-11-13 RX ORDER — AMLODIPINE AND OLMESARTAN MEDOXOMIL 10; 40 MG/1; MG/1
1 TABLET ORAL DAILY
COMMUNITY

## 2023-11-13 RX ORDER — FLUMAZENIL 0.1 MG/ML
0.2 INJECTION INTRAVENOUS AS NEEDED
Status: DISCONTINUED | OUTPATIENT
Start: 2023-11-13 | End: 2023-11-13

## 2023-11-13 RX ADMIN — SCOPALAMINE 1 PATCH: 1 PATCH, EXTENDED RELEASE TRANSDERMAL at 08:19

## 2023-11-13 RX ADMIN — AMLODIPINE BESYLATE 10 MG: 10 TABLET ORAL at 10:51

## 2023-11-13 RX ADMIN — DEXAMETHASONE SODIUM PHOSPHATE 4 MG: 4 INJECTION INTRA-ARTICULAR; INTRALESIONAL; INTRAMUSCULAR; INTRAVENOUS; SOFT TISSUE at 08:19

## 2023-11-13 RX ADMIN — FENTANYL CITRATE 100 MCG: 50 INJECTION, SOLUTION INTRAMUSCULAR; INTRAVENOUS at 08:45

## 2023-11-13 RX ADMIN — SODIUM CHLORIDE, POTASSIUM CHLORIDE, SODIUM LACTATE AND CALCIUM CHLORIDE 100 ML/HR: 600; 310; 30; 20 INJECTION, SOLUTION INTRAVENOUS at 20:08

## 2023-11-13 RX ADMIN — PHENAZOPYRIDINE HYDROCHLORIDE 200 MG: 200 TABLET ORAL at 12:19

## 2023-11-13 RX ADMIN — SODIUM CHLORIDE, POTASSIUM CHLORIDE, SODIUM LACTATE AND CALCIUM CHLORIDE 100 ML/HR: 600; 310; 30; 20 INJECTION, SOLUTION INTRAVENOUS at 08:16

## 2023-11-13 RX ADMIN — Medication 500 MCG: at 09:11

## 2023-11-13 RX ADMIN — Medication 100 MCG: at 09:01

## 2023-11-13 RX ADMIN — TAMSULOSIN HYDROCHLORIDE 0.4 MG: 0.4 CAPSULE ORAL at 20:08

## 2023-11-13 RX ADMIN — PROPOFOL 200 MG: 10 INJECTION, EMULSION INTRAVENOUS at 08:45

## 2023-11-13 RX ADMIN — PHENAZOPYRIDINE HYDROCHLORIDE 200 MG: 200 TABLET ORAL at 17:19

## 2023-11-13 RX ADMIN — ONDANSETRON 4 MG: 2 INJECTION INTRAMUSCULAR; INTRAVENOUS at 08:45

## 2023-11-13 RX ADMIN — Medication 10 ML: at 10:52

## 2023-11-13 RX ADMIN — LOSARTAN POTASSIUM 100 MG: 50 TABLET, FILM COATED ORAL at 10:51

## 2023-11-13 RX ADMIN — ACETAMINOPHEN 1000 MG: 500 TABLET, FILM COATED ORAL at 08:19

## 2023-11-13 RX ADMIN — PREDNISONE 10 MG: 10 TABLET ORAL at 10:51

## 2023-11-13 RX ADMIN — TAMSULOSIN HYDROCHLORIDE 0.4 MG: 0.4 CAPSULE ORAL at 10:51

## 2023-11-13 RX ADMIN — GABAPENTIN 100 MG: 100 CAPSULE ORAL at 10:51

## 2023-11-13 RX ADMIN — PANTOPRAZOLE SODIUM 40 MG: 40 TABLET, DELAYED RELEASE ORAL at 06:00

## 2023-11-13 RX ADMIN — GABAPENTIN 100 MG: 100 CAPSULE ORAL at 20:08

## 2023-11-13 RX ADMIN — GABAPENTIN 100 MG: 100 CAPSULE ORAL at 17:19

## 2023-11-13 RX ADMIN — Medication 100 MCG: at 08:56

## 2023-11-13 RX ADMIN — Medication 200 MCG: at 09:06

## 2023-11-13 NOTE — ED PROVIDER NOTES
Subjective   History of Present Illness  58-year-old male presents to the emergency department with complaint of right lower quadrant abdominal pain, nausea and vomiting.  He has a history of hypertension, GERD.  He also has a history of renal stones but has never passed a stone.  Been dealing with nonspecific abdominal pain for the past few months and has had work-ups to include upper and lower endoscopy.  He reports rapid onset of pain around noon earlier today.  Describes pain as a sharp pain in the right lower quadrant, severe.  Radiates to his groin.  No dysuria or hematuria.  Associated with nausea and vomiting.  He had numerous episodes of nonbloody nonbilious emesis.  No diarrhea.  No fever chills, cough or congestion, chest pain or shortness of breath.  No aggravating or alleviating factors.    History provided by:  Patient      Review of Systems   All other systems reviewed and are negative.      Past Medical History:   Diagnosis Date    Acute kidney injury 11/12/2023    Allergic conjunctivitis     Allergic rhinitis     Cholelithiasis     Chronic sinusitis     Deviated septum     GERD (gastroesophageal reflux disease)     Hypertension     Hypertrophy of nasal turbinates     Refractory obstruction of nasal airway     Rhinitis medicamentosa        No Known Allergies    Past Surgical History:   Procedure Laterality Date    ADENOIDECTOMY      COLONOSCOPY  10/24/2013    normal exam excpetion hemorrhoids repeat 5 year    COLONOSCOPY N/A 11/9/2023    Procedure: COLONOSCOPY WITH ANESTHESIA;  Surgeon: Ian Woodard MD;  Location: [facility] ENDOSCOPY;  Service: Gastroenterology;  Laterality: N/A;  pre epigastric pain  post polyps  DShane Rust    ENDOSCOPY N/A 11/9/2023    Procedure: ESOPHAGOGASTRODUODENOSCOPY WITH ANESTHESIA;  Surgeon: Ian Woodard MD;  Location: [facility] ENDOSCOPY;  Service: Gastroenterology;  Laterality: N/A;  pre  post      KNEE SURGERY      NECK SURGERY      SHOULDER SURGERY      SINUS  SURGERY  2010    kest, FESS, ITR    TONSILLECTOMY         Family History   Problem Relation Age of Onset    No Known Problems Mother     No Known Problems Father     Colon cancer Neg Hx     Colon polyps Neg Hx        Social History     Socioeconomic History    Marital status:    Tobacco Use    Smoking status: Never    Smokeless tobacco: Current    Tobacco comments:     Dip daily   Vaping Use    Vaping Use: Never used   Substance and Sexual Activity    Alcohol use: No    Drug use: Never    Sexual activity: Yes     Partners: Female     Birth control/protection: Vasectomy           Objective   Physical Exam  Vitals and nursing note reviewed.   Constitutional:       General: He is not in acute distress.     Appearance: He is well-developed and normal weight.      Comments: Ill-appearing but no distress   HENT:      Head: Normocephalic and atraumatic.      Nose: Nose normal. No congestion or rhinorrhea.      Mouth/Throat:      Mouth: Mucous membranes are moist.      Pharynx: No posterior oropharyngeal erythema.   Eyes:      Extraocular Movements: Extraocular movements intact.      Conjunctiva/sclera: Conjunctivae normal.      Pupils: Pupils are equal, round, and reactive to light.   Cardiovascular:      Rate and Rhythm: Normal rate and regular rhythm.      Pulses: Normal pulses.      Heart sounds: Normal heart sounds. No murmur heard.  Pulmonary:      Effort: Pulmonary effort is normal.      Breath sounds: Normal breath sounds. No wheezing, rhonchi or rales.   Abdominal:      General: Abdomen is flat. Bowel sounds are normal.      Palpations: Abdomen is soft.   Skin:     General: Skin is warm.      Capillary Refill: Capillary refill takes less than 2 seconds.   Neurological:      General: No focal deficit present.      Mental Status: He is alert and oriented to person, place, and time. Mental status is at baseline.         Procedures       Lab Results (last 24 hours)       Procedure Component Value Units Date/Time     CBC & Differential [[id number]]  (Abnormal) Collected: 11/12/23 1742    Specimen: Blood Updated: 11/12/23 1752    Narrative:      The following orders were created for panel order CBC & Differential.  Procedure                               Abnormality         Status                     ---------                               -----------         ------                     CBC Auto Differential[[id number]]        Abnormal            Final result                 Please view results for these tests on the individual orders.    Comprehensive Metabolic Panel [[id number]]  (Abnormal) Collected: 11/12/23 1742    Specimen: Blood Updated: 11/12/23 1810     Glucose 113 mg/dL      BUN 24 mg/dL      Creatinine 2.32 mg/dL      Sodium 141 mmol/L      Potassium 3.9 mmol/L      Comment: Slight hemolysis detected by analyzer. Result may be falsely elevated.        Chloride 104 mmol/L      CO2 26.0 mmol/L      Calcium 9.8 mg/dL      Total Protein 7.0 g/dL      Albumin 4.2 g/dL      ALT (SGPT) 20 U/L      AST (SGOT) 15 U/L      Alkaline Phosphatase 61 U/L      Total Bilirubin 0.3 mg/dL      Globulin 2.8 gm/dL      A/G Ratio 1.5 g/dL      BUN/Creatinine Ratio 10.3     Anion Gap 11.0 mmol/L      eGFR 31.4 mL/min/1.73     Narrative:      GFR Normal >60  Chronic Kidney Disease <60  Kidney Failure <15      Protime-INR [[id number]]  (Normal) Collected: 11/12/23 1742    Specimen: Blood Updated: 11/12/23 1803     Protime 13.0 Seconds      INR 0.98    Lipase [[id number]]  (Normal) Collected: 11/12/23 1742    Specimen: Blood Updated: 11/12/23 1805     Lipase 51 U/L     CBC Auto Differential [[id number]]  (Abnormal) Collected: 11/12/23 1742    Specimen: Blood Updated: 11/12/23 1752     WBC 16.60 10*3/mm3      RBC 5.00 10*6/mm3      Hemoglobin 15.3 g/dL      Hematocrit 45.7 %      MCV 91.4 fL      MCH 30.6 pg      MCHC 33.5 g/dL      RDW 13.9 %      RDW-SD 46.8 fl      MPV 8.8 fL      Platelets 354 10*3/mm3      Neutrophil % 81.7 %      Lymphocyte %  7.1 %      Monocyte % 7.5 %      Eosinophil % 0.5 %      Basophil % 0.7 %      Immature Grans % 2.5 %      Neutrophils, Absolute 13.55 10*3/mm3      Lymphocytes, Absolute 1.18 10*3/mm3      Monocytes, Absolute 1.25 10*3/mm3      Eosinophils, Absolute 0.08 10*3/mm3      Basophils, Absolute 0.12 10*3/mm3      Immature Grans, Absolute 0.42 10*3/mm3      nRBC 0.0 /100 WBC     Urinalysis With Culture If Indicated - Urine, Clean Catch [[id number]]  (Abnormal) Collected: 11/12/23 1752    Specimen: Urine, Clean Catch Updated: 11/12/23 1819     Color, UA Dark Yellow     Appearance, UA Clear     pH, UA 5.5     Specific Gravity, UA >1.030     Glucose, UA Negative     Ketones, UA 15 mg/dL (1+)     Bilirubin, UA Small (1+)     Blood, UA Large (3+)     Protein, UA 30 mg/dL (1+)     Leuk Esterase, UA Trace     Nitrite, UA Negative     Urobilinogen, UA 1.0 E.U./dL    Narrative:      In absence of clinical symptoms, the presence of pyuria, bacteria, and/or nitrites on the urinalysis result does not correlate with infection.    Urinalysis, Microscopic Only - Urine, Clean Catch [[id number]]  (Abnormal) Collected: 11/12/23 1752    Specimen: Urine, Clean Catch Updated: 11/12/23 1819     RBC, UA 11-20 /HPF      WBC, UA 3-5 /HPF      Comment: Urine culture not indicated.        Bacteria, UA None Seen /HPF      Squamous Epithelial Cells, UA 0-2 /HPF      Hyaline Casts, UA None Seen /LPF      Calcium Oxalate Crystals, UA Moderate/2+ /HPF      Mucus, UA Small/1+ /HPF      Methodology Manual Light Microscopy         CT Abdomen Pelvis Stone Protocol    Result Date: 11/12/2023  EXAM: CT ABDOMEN PELVIS STONE PROTOCOL-  INDICATION: Flank pain, kidney stone suspected    TECHNIQUE: Helically acquired CT images were obtained of the abdomen and pelvis without intravenous contrast. Coronal and sagittal reformations were performed.    DOSE LENGTH PRODUCT: 417 mGy cm. Automated exposure control was also utilized to decrease patient radiation dose.   COMPARISON: 8/7/2023.  FINDINGS:  Lower Chest: Unremarkable.  Liver: Unremarkable.  Biliary Tree: Unremarkable.  Spleen: Unremarkable.  Pancreas: Unremarkable.  Adrenal Glands: Unremarkable.  Kidneys/Ureters/Bladder: 5 mm calculus in the distal right ureter with moderate upstream hydronephrosis. Mild right perinephric stranding. Punctate renal calculi in the interpole of the right kidney.  Reproductive Organs: Unremarkable.  Gastrointestinal Tract: Unremarkable.  Lymphatics: Unremarkable.  Vasculature: Mild atherosclerosis.  Peritoneum/Retroperitoneum: No additional findings.  Abdominal Wall/Soft Tissues: Tiny fat-containing umbilical hernia. Small fat-containing inguinal hernias.  Osseous Structures: No acute or suspicious osseous finding.          5 mm calculus in the distal right ureter with moderate upstream hydronephrosis.     Punctate calculi in the lower pole of the right kidney.  This report was signed and finalized on 11/12/2023 6:36 PM CST by Ortega Shook.        ED Course  ED Course as of 11/12/23 1944   Sun Nov 12, 2023 1939 58-year-old male with history of hypertension and GERD presents to the ED with complaint of rapid onset right flank pain lower quadrant pain rating to the groin associate with nausea vomiting.  11-20 RBCs on UA.  CT stone protocol ordered, revealed 5 mm stone in the distal right ureter with some moderate upstream hydroureteronephrosis.  No evidence of urinary tract infection.  Baseline creatinine 0.95, today creatinine 2.32.    Feeling better with fluids, pain meds, Zofran.  Will hold NSAIDs given ADRIANA, treat with Dilaudid.  Spoke to urology, Dr. Canela, given his morbidities recommend admission to the hospital service, keep n.p.o. after midnight and plan for intervention tomorrow morning. [AW]      ED Course User Index  [AW] Aubrey Mccollum MD                                           Medical Decision Making  Amount and/or Complexity of Data Reviewed  Labs:  ordered.  Radiology: ordered.    Risk  Prescription drug management.        Final diagnoses:   Ureteral colic   Acute kidney injury       ED Disposition  ED Disposition       ED Disposition   Decision to Admit    Condition   --    Comment   Level of Care: Med/Surg [1]   Diagnosis: Acute kidney injury [977165]   Admitting Physician: DYAN HERNANDEZ [[id number]]   Attending Physician: DYAN HERNANDEZ [[id number]]   Certification: I Certify That Inpatient Hospital Services Are Medically Necessary For Greater Than 2 Midnights                 No follow-up provider specified.       Medication List      No changes were made to your prescriptions during this visit.            Aubrey Mccollum MD  11/12/23 1633

## 2023-11-13 NOTE — ANESTHESIA POSTPROCEDURE EVALUATION
"Patient: Cecil Saucedo    Procedure Summary       Date: 11/13/23 Room / [facility]:  [facility]  /  [facility]    Anesthesia Start: 0838 Anesthesia Stop: 0936    Procedures:       CYSTOSCOPY (Urethra)      URETEROSCOPY STENT INSERTION (Right) Diagnosis:     Surgeons: Abdelrahman Aguirre MD Provider: Jacob Garcia CRNA    Anesthesia Type: general ASA Status: 2            Anesthesia Type: general    Vitals  Vitals Value Taken Time   /78 11/13/23 1001   Temp 98 °F (36.7 °C) 11/13/23 1005   Pulse 67 11/13/23 1010   Resp 14 11/13/23 1010   SpO2 93 % 11/13/23 1010           Post Anesthesia Care and Evaluation    Patient location during evaluation: PACU  Patient participation: complete - patient participated  Level of consciousness: awake and alert  Pain management: adequate    Airway patency: patent  Anesthetic complications: No anesthetic complications  PONV Status: none  Cardiovascular status: acceptable and hemodynamically stable  Respiratory status: acceptable  Hydration status: acceptable    Comments: Blood pressure 134/78, pulse 67, temperature 98 °F (36.7 °C), temperature source Temporal, resp. rate 14, height 167.6 cm (66\"), weight 87.3 kg (192 lb 6.4 oz), SpO2 93%.    Patient discharged from PACU based upon Edenilson score. Please see RN notes for further details    "

## 2023-11-13 NOTE — ANESTHESIA PREPROCEDURE EVALUATION
Anesthesia Evaluation     Patient summary reviewed   history of anesthetic complications:  PONV  NPO Solid Status: > 8 hours  NPO Liquid Status: > 2 hours           Airway   Mallampati: I  TM distance: >3 FB  Neck ROM: full  No difficulty expected  Dental      Pulmonary    (-) asthma, sleep apnea, not a smoker  Cardiovascular   Exercise tolerance: good (4-7 METS)    (+) hypertension  (-) past MI, CAD, dysrhythmias, cardiac stents      Neuro/Psych  (-) seizures, TIA, CVA  GI/Hepatic/Renal/Endo    (+) GERD, renal disease- stones  (-) liver disease, diabetes    ROS Comment:       Musculoskeletal     Abdominal    Substance History      OB/GYN          Other                      Anesthesia Plan    ASA 2     general     intravenous induction     Anesthetic plan, risks, benefits, and alternatives have been provided, discussed and informed consent has been obtained with: patient.      CODE STATUS:    Level Of Support Discussed With: Patient  Code Status (Patient has no pulse and is not breathing): CPR (Attempt to Resuscitate)  Medical Interventions (Patient has pulse or is breathing): Full Support

## 2023-11-13 NOTE — CONSULTS
Urology    Mr. Saucedo is 58 y.o. male    REASON FOR CONSULT/CHIEF COMPLAINT: Right distal ureteral calculus    HPI  Patient well-known to me with a prior history of a right proximal stone associated with no symptoms at the time I saw him.  He was having a lot of difficulty with left epigastric pain.  He has had an evaluation at that.  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  Over the last 3 to 4 days he has had increasing pain in the right flank.  As stated above he has a known stone in this area.  He started having severe nausea and vomiting over the last 48 hours.  He is been unable to hydrate.  He was admitted to the hospitalist service due to acute kidney injury.  Urology consulted for right-sided ureteral obstruction    The following portions of the patient's history were reviewed and updated as appropriate: allergies, current medications, past family history, past medical history, past social history, past surgical history and problem list.    Review of Systems   Gastrointestinal:  Positive for nausea and vomiting.   Genitourinary:  Positive for difficulty urinating and flank pain. Negative for testicular pain.       Medications Prior to Admission   Medication Sig Dispense Refill Last Dose    amlodipine-olmesartan (ANGEL) 10-40 MG per tablet    11/12/2023    gabapentin (NEURONTIN) 100 MG capsule Take 1 capsule by mouth 3 (Three) Times a Day.       omeprazole (priLOSEC) 20 MG capsule Take 1 capsule by mouth Daily.       polyethylene glycol (GoLYTELY) 236 g solution Take as directed by office instructions. 4000 mL 0     predniSONE (DELTASONE) 1 MG tablet Take 10 tablets by mouth Daily.       vitamin D (ERGOCALCIFEROL) 1.25 MG (24282 UT) capsule capsule Take 1 capsule by mouth 1 (One) Time Per Week.            Current Facility-Administered Medications:     acetaminophen (TYLENOL) tablet 650 mg, 650 mg, Oral, Q4H HENRRYN, Elizabeth Roblero DO     losartan (COZAAR) tablet 100 mg, 100 mg, Oral, Q24H **AND** amLODIPine (NORVASC) tablet 10 mg, 10 mg, Oral, Q24H, Elizabeth Roblero DO    gabapentin (NEURONTIN) capsule 100 mg, 100 mg, Oral, TID, Elizabeth Roblero DO, 100 mg at 11/12/23 2111    HYDROmorphone (DILAUDID) injection 0.5 mg, 0.5 mg, Intravenous, Q2H PRN, Elizabeth Roblero DO    lactated ringers infusion, 100 mL/hr, Intravenous, Continuous, lEizabeth Roblero DO, Last Rate: 100 mL/hr at 11/12/23 2111, 100 mL/hr at 11/12/23 2111    ondansetron (ZOFRAN) injection 4 mg, 4 mg, Intravenous, Q6H PRN, Elizabeth Roblero DO    oxyCODONE-acetaminophen (PERCOCET) 5-325 MG per tablet 1 tablet, 1 tablet, Oral, Q8H PRN, Elizabeth Roblero DO    pantoprazole (PROTONIX) EC tablet 40 mg, 40 mg, Oral, Q AM, Elizabeth Roblero DO, 40 mg at 11/13/23 0600    predniSONE (DELTASONE) tablet 10 mg, 10 mg, Oral, Daily, Elizabeth Roblero DO    [COMPLETED] Insert Peripheral IV, , , Once **AND** sodium chloride 0.9 % flush 10 mL, 10 mL, Intravenous, PRN, Aubrey Mccollum MD    sodium chloride 0.9 % flush 10 mL, 10 mL, Intravenous, Q12H, Elizabeth Roblero DO, 10 mL at 11/12/23 2120    sodium chloride 0.9 % flush 10 mL, 10 mL, Intravenous, PRN, Elizabeth Roblero DO    sodium chloride 0.9 % infusion 40 mL, 40 mL, Intravenous, PRN, Elizabeth Roblero DO    tamsulosin (FLOMAX) 24 hr capsule 0.4 mg, 0.4 mg, Oral, BID, Elizabeth Roblero DO, 0.4 mg at 11/12/23 2111    Past Medical History:   Diagnosis Date    Acute kidney injury 11/12/2023    Allergic conjunctivitis     Allergic rhinitis     Cholelithiasis     Chronic sinusitis     Deviated septum     GERD (gastroesophageal reflux disease)     Hypertension     Hypertrophy of nasal turbinates     Refractory obstruction of nasal airway     Rhinitis medicamentosa        Past Surgical History:   Procedure Laterality Date    ADENOIDECTOMY      COLONOSCOPY  10/24/2013    normal exam excpetion hemorrhoids repeat 5 year     "COLONOSCOPY N/A 11/9/2023    Procedure: COLONOSCOPY WITH ANESTHESIA;  Surgeon: Ian Woodard MD;  Location:  [facility] ENDOSCOPY;  Service: Gastroenterology;  Laterality: N/A;  pre epigastric pain  post polyps  Jake Rust    ENDOSCOPY N/A 11/9/2023    Procedure: ESOPHAGOGASTRODUODENOSCOPY WITH ANESTHESIA;  Surgeon: Ian Woodard MD;  Location:  [facility] ENDOSCOPY;  Service: Gastroenterology;  Laterality: N/A;  pre  post  [place]    KNEE SURGERY      NECK SURGERY      SHOULDER SURGERY      SINUS SURGERY  2010    kest, FESS, ITR    TONSILLECTOMY         Social History     Socioeconomic History    Marital status:    Tobacco Use    Smoking status: Never    Smokeless tobacco: Current    Tobacco comments:     Dip daily   Vaping Use    Vaping Use: Never used   Substance and Sexual Activity    Alcohol use: No    Drug use: Never    Sexual activity: Yes     Partners: Female     Birth control/protection: Vasectomy       Family History   Problem Relation Age of Onset    No Known Problems Mother     No Known Problems Father     Colon cancer Neg Hx     Colon polyps Neg Hx        /74 (BP Location: Right arm, Patient Position: Lying)   Pulse 75   Temp 98 °F (36.7 °C) (Oral)   Resp 16   Ht 167.6 cm (66\")   Wt 87.3 kg (192 lb 6.4 oz)   SpO2 96%   BMI 31.05 kg/m²     Physical Exam  Constitutional:   Temp:  [98 °F (36.7 °C)-98.6 °F (37 °C)] 98 °F (36.7 °C)  Heart Rate:  [67-86] 75  Resp:  [16-18] 16  BP: (106-149)/() 139/74  ] Well developed, well nourished, no distress  Respiratory:   Effort unlabored; Movements symmetric  GI:   No mass or hernia noted, not distended or tender   No enlargement of spleen or liver noted  Skin:   No pallor or cyanosis; No obvious rash  Psych:   Alert, Oriented x 3         Lab Results   Component Value Date    GLUCOSE 103 (H) 11/13/2023    BUN 22 11/13/2023    CREATININE 1.35 (H) 11/13/2023    BCR 16.3 11/13/2023    CO2 28.0 11/13/2023    CALCIUM 9.5 11/13/2023    ALBUMIN 3.2 (L) " "11/13/2023    AST 11 11/13/2023    ALT 16 11/13/2023     Lab Results   Component Value Date    GLUCOSE 103 (H) 11/13/2023    CALCIUM 9.5 11/13/2023     11/13/2023    K 4.3 11/13/2023    CO2 28.0 11/13/2023     11/13/2023    BUN 22 11/13/2023    CREATININE 1.35 (H) 11/13/2023    BCR 16.3 11/13/2023    ANIONGAP 8.0 11/13/2023     Lab Results   Component Value Date    WBC 12.48 (H) 11/13/2023    HGB 13.0 11/13/2023    HCT 39.6 11/13/2023    MCV 93.0 11/13/2023     11/13/2023     No results found for: \"PSA\"  No results found for: \"URINECX\"  Brief Urine Lab Results  (Last result in the past 365 days)        Color   Clarity   Blood   Leuk Est   Nitrite   Protein   CREAT   Urine HCG        11/12/23 1752 Dark Yellow   Clear   Large (3+)   Trace   Negative   30 mg/dL (1+)                   Imaging Results (Last 7 Days)       Procedure Component Value Units Date/Time    CT Abdomen Pelvis Stone Protocol [[id number]] Collected: 11/12/23 1831     Updated: 11/12/23 1839    Narrative:      EXAM: CT ABDOMEN PELVIS STONE PROTOCOL-     INDICATION: Flank pain, kidney stone suspected        TECHNIQUE: Helically acquired CT images were obtained of the abdomen and  pelvis without intravenous contrast. Coronal and sagittal reformations  were performed.        DOSE LENGTH PRODUCT: 417 mGy cm. Automated exposure control was also  utilized to decrease patient radiation dose.     COMPARISON: 8/7/2023.     FINDINGS:     Lower Chest: Unremarkable.     Liver: Unremarkable.     Biliary Tree: Unremarkable.     Spleen: Unremarkable.     Pancreas: Unremarkable.     Adrenal Glands: Unremarkable.     Kidneys/Ureters/Bladder: 5 mm calculus in the distal right ureter with  moderate upstream hydronephrosis. Mild right perinephric stranding.  Punctate renal calculi in the interpole of the right kidney.     Reproductive Organs: Unremarkable.     Gastrointestinal Tract: Unremarkable.      Lymphatics: Unremarkable.     Vasculature: Mild " atherosclerosis.     Peritoneum/Retroperitoneum: No additional findings.     Abdominal Wall/Soft Tissues: Tiny fat-containing umbilical hernia. Small  fat-containing inguinal hernias.     Osseous Structures: No acute or suspicious osseous finding.       Impression:            5 mm calculus in the distal right ureter with moderate upstream  hydronephrosis.         Punctate calculi in the lower pole of the right kidney.     This report was signed and finalized on 11/12/2023 6:36 PM CST by Ortega Shook.           I reviewed these images.  He does have a 5 mm stones below the iliac vessels in the distal ureter.  Renal parenchyma looks good.  There is definitely some perinephric inflammation.  No urinoma noted      Assessment and Plan  Right distal ureteral calculus with obstruction  Acute kidney injury probably secondary to obstruction and dehydration secondary to nausea and vomiting    -He has an absolute indication and need for urgent procedure to relieve the obstruction. The patient has a right distal ureteral stone as defined by symptoms and radiographic studies reviewed with the patient.  Further evaluation will include stone analysis, retrograde pyelogram,, and postoperative renal ultrasound to be sure the obstruction has resolved.  Options for the management of such stone is discussed based upon size of stone, location, symptoms, and probable composition including watchful waiting, ESWL, ureteroscopy , use of ureteral stenting, percutaneous management, and open surgery.  Based upon this discussion we have elected to proceed with ureteroscopic management of the stone.  The patient understands that a laser or other fragmentation device may be needed.  The need for ureteral stenting including its required removal was discussed.  Risks of pain, discomfort from stent, ureteral stricture that may occur in the future, inability to render stone free, ureteral avulsion/perforation are all discussed.  I explained that  in extreme cases this could lead to loss of function of the affected kidney.    (Please note that portions of this note were completed with a voice recognition program.)  Abdelrahman Aguirre MD  11/13/23  07:40 CST

## 2023-11-13 NOTE — OP NOTE
"Operative Summary    Cecil Saucedo  Date of Procedure: 11/13/2023    Pre-op Diagnosis:       Post-op Diagnosis:     * No Diagnosis Codes entered *    Procedure/CPT® Codes:      Procedure(s):  CYSTOSCOPY  BILATERAL RETROGRADE URETEROPYELOGRAMS  RIGHT URETEROSCOPY   RIGHT INDWELLING URETERAL STENT INSERTION    Surgeon(s):  Abdelrahman Aguirre MD    Anesthesia: General    Staff:   Circulator: Inder Ham RN  Scrub Person: Javed Maloney; Shakira Yeimi    Indications for procedure:  Right distal ureteral calculus with obstruction/acute kidney injury    Findings:   Cystoscopy findings: Urethra without stricture.  Prostate with minimal enlargement.  Bladder is minimally trabeculated no diverticuli or masses.  Orifices orthotopic position and normal in their configuration  BILATERAL retrograde ureteropyelogram findings: The retrograde pyelogram(s) interpretation(s) is/are dictated per separate report under \"Brief op note\" document type).  Ureteroscopic findings: I am able to inspect the lower 20% of the ureter.  The urothelium appears normal.  He has significant narrowing approximately 6 to 8 cm from the ureteral orifice.  I suspect this is spasm secondary to the stone.  I cannot visualize the stone to do laser lithotripsy.    Procedure details:     After appropriate anesthesia, positioning, prep and drape, timeout protocol was observed.      A 22 Uzbek cystoscope sheath with a 30° lens is inserted.   30 and 70 degree lens inspection of bladder is carried out.  Cystoscopy findings are described above.  I did bilateral retrograde pyelograms on the patient because he has acute kidney injury.    The left ureteral orifice is identified. A 5 Uzbek cone-tip catheter is placed just inside the ureteral orifice and half-strength contrast is injected.  Interpretation of this study as described per separate report under \"brief op note\".    The right ureteral orifice is identified. A 5 Uzbek cone-tip catheter is placed " "just inside the ureteral orifice and half-strength contrast is injected.  Interpretation of this study as described per separate report under \"brief op note\".    A 0.038 mm Sensor guidewire is then manipulated by the stone under direct fluoroscopic vision and manipulated by the stone to reach the renal pelvis.    The Mayer ultrathin semirigid ureteroscope is passed alongside the wire.  I was able to negotiate the orifice, intramural ureter and the distal 6 to 8 cm of the ureter.  Described the findings above.  I then switched over to the Mayer \"needle \"semirigid ureteroscope which is of smaller caliber but again was not able to get below the narrowing described.  I could not visualize the stone as stated.  At this point I elected to pass ureteral stent.  Ureteroscope was removed.    I backloaded the sensor wire through the cystoscope.  Over this I passed a [employer] 6 Slovenian true Tria soft that was 24 cm in length.  I could not negotiate this past the stone.  I removed the stent and was eventually able to get a 5 Slovenian open-ended ureteral catheter by the stone over the wire into the pelvis.  After I did this I was able to switch to an Amplatz Super Stiff wire that was 0.035 mm.  I was able to pass the same stent over this guidewire with difficulty when I reached the level of the stone but guided into the renal pelvis curling the proximal end in the pelvis and the distal end in the bladder.  The string was removed.      Estimated Blood Loss: Less than 30 mL    Specimens:                None      Drains:   Ureteral Drain/Stent Right ureter 6 Fr. (Active)   Site Assessment [facility] 11/13/23 0931       Complications: none    Plan: Patient will need to be stented for a week or 2.  If he tolerates the stent I prefer to wait 2 weeks because I know I will get plenty of dilation of the ureter to be able to pass the ureteroscope.  If he has significant difficulty then we may need to proceed it about a week to try to get the " stone out.    Abdelrahman Aguirre MD     Date: 11/13/2023  Time: 09:47 CST

## 2023-11-13 NOTE — ANESTHESIA PROCEDURE NOTES
Airway  Urgency: elective    Date/Time: 11/13/2023 8:45 AM  Airway not difficult    General Information and Staff    Patient location during procedure: OR  CRNA/CAA: Jacob Garcia CRNA    Indications and Patient Condition    Preoxygenated: yes  Mask difficulty assessment: 0 - not attempted    Final Airway Details  Final airway type: supraglottic airway      Successful airway: I-gel  Size 5     Number of attempts at approach: 1  Assessment: lips, teeth, and gum same as pre-op

## 2023-11-13 NOTE — H&P (VIEW-ONLY)
Urology    Mr. Saucedo is 58 y.o. male    REASON FOR CONSULT/CHIEF COMPLAINT: Right distal ureteral calculus    HPI  Patient well-known to me with a prior history of a right proximal stone associated with no symptoms at the time I saw him.  He was having a lot of difficulty with left epigastric pain.  He has had an evaluation at that.  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  Over the last 3 to 4 days he has had increasing pain in the right flank.  As stated above he has a known stone in this area.  He started having severe nausea and vomiting over the last 48 hours.  He is been unable to hydrate.  He was admitted to the hospitalist service due to acute kidney injury.  Urology consulted for right-sided ureteral obstruction    The following portions of the patient's history were reviewed and updated as appropriate: allergies, current medications, past family history, past medical history, past social history, past surgical history and problem list.    Review of Systems   Gastrointestinal:  Positive for nausea and vomiting.   Genitourinary:  Positive for difficulty urinating and flank pain. Negative for testicular pain.       Medications Prior to Admission   Medication Sig Dispense Refill Last Dose    amlodipine-olmesartan (ANGEL) 10-40 MG per tablet    11/12/2023    gabapentin (NEURONTIN) 100 MG capsule Take 1 capsule by mouth 3 (Three) Times a Day.       omeprazole (priLOSEC) 20 MG capsule Take 1 capsule by mouth Daily.       polyethylene glycol (GoLYTELY) 236 g solution Take as directed by office instructions. 4000 mL 0     predniSONE (DELTASONE) 1 MG tablet Take 10 tablets by mouth Daily.       vitamin D (ERGOCALCIFEROL) 1.25 MG (37469 UT) capsule capsule Take 1 capsule by mouth 1 (One) Time Per Week.            Current Facility-Administered Medications:     acetaminophen (TYLENOL) tablet 650 mg, 650 mg, Oral, Q4H HENRRYN, Elizabeth Roblero DO     losartan (COZAAR) tablet 100 mg, 100 mg, Oral, Q24H **AND** amLODIPine (NORVASC) tablet 10 mg, 10 mg, Oral, Q24H, Elizabeth Roblero DO    gabapentin (NEURONTIN) capsule 100 mg, 100 mg, Oral, TID, Elizabeth Roblero DO, 100 mg at 11/12/23 2111    HYDROmorphone (DILAUDID) injection 0.5 mg, 0.5 mg, Intravenous, Q2H PRN, Elizabeth Roblero DO    lactated ringers infusion, 100 mL/hr, Intravenous, Continuous, Elizabeth Roblero DO, Last Rate: 100 mL/hr at 11/12/23 2111, 100 mL/hr at 11/12/23 2111    ondansetron (ZOFRAN) injection 4 mg, 4 mg, Intravenous, Q6H PRN, Elizabeth Roblero DO    oxyCODONE-acetaminophen (PERCOCET) 5-325 MG per tablet 1 tablet, 1 tablet, Oral, Q8H PRN, Elizabeth Roblero DO    pantoprazole (PROTONIX) EC tablet 40 mg, 40 mg, Oral, Q AM, Elizabeth Roblero DO, 40 mg at 11/13/23 0600    predniSONE (DELTASONE) tablet 10 mg, 10 mg, Oral, Daily, Elizabeth Roblero DO    [COMPLETED] Insert Peripheral IV, , , Once **AND** sodium chloride 0.9 % flush 10 mL, 10 mL, Intravenous, PRN, Aubrey Mccollum MD    sodium chloride 0.9 % flush 10 mL, 10 mL, Intravenous, Q12H, Elizabeth Roblero DO, 10 mL at 11/12/23 2120    sodium chloride 0.9 % flush 10 mL, 10 mL, Intravenous, PRN, Elizabeth Roblero DO    sodium chloride 0.9 % infusion 40 mL, 40 mL, Intravenous, PRN, Elizabeth Roblero DO    tamsulosin (FLOMAX) 24 hr capsule 0.4 mg, 0.4 mg, Oral, BID, Elizabeth Roblero DO, 0.4 mg at 11/12/23 2111    Past Medical History:   Diagnosis Date    Acute kidney injury 11/12/2023    Allergic conjunctivitis     Allergic rhinitis     Cholelithiasis     Chronic sinusitis     Deviated septum     GERD (gastroesophageal reflux disease)     Hypertension     Hypertrophy of nasal turbinates     Refractory obstruction of nasal airway     Rhinitis medicamentosa        Past Surgical History:   Procedure Laterality Date    ADENOIDECTOMY      COLONOSCOPY  10/24/2013    normal exam excpetion hemorrhoids repeat 5 year     "COLONOSCOPY N/A 11/9/2023    Procedure: COLONOSCOPY WITH ANESTHESIA;  Surgeon: Ian Woodard MD;  Location:  [facility];  Service: Gastroenterology;  Laterality: N/A;  pre epigastric pain  post polyps  Jake Rust    ENDOSCOPY N/A 11/9/2023    Procedure: ESOPHAGOGASTRODUODENOSCOPY WITH ANESTHESIA;  Surgeon: Ian Woodard MD;  Location:  [facility];  Service: Gastroenterology;  Laterality: N/A;  pre  post  [place]    KNEE SURGERY      NECK SURGERY      SHOULDER SURGERY      SINUS SURGERY  2010    kest, FESS, ITR    TONSILLECTOMY         Social History     Socioeconomic History    Marital status:    Tobacco Use    Smoking status: Never    Smokeless tobacco: Current    Tobacco comments:     Dip daily   Vaping Use    Vaping Use: Never used   Substance and Sexual Activity    Alcohol use: No    Drug use: Never    Sexual activity: Yes     Partners: Female     Birth control/protection: Vasectomy       Family History   Problem Relation Age of Onset    No Known Problems Mother     No Known Problems Father     Colon cancer Neg Hx     Colon polyps Neg Hx        /74 (BP Location: Right arm, Patient Position: Lying)   Pulse 75   Temp 98 °F (36.7 °C) (Oral)   Resp 16   Ht 167.6 cm (66\")   Wt 87.3 kg (192 lb 6.4 oz)   SpO2 96%   BMI 31.05 kg/m²     Physical Exam  Constitutional:   Temp:  [98 °F (36.7 °C)-98.6 °F (37 °C)] 98 °F (36.7 °C)  Heart Rate:  [67-86] 75  Resp:  [16-18] 16  BP: (106-149)/() 139/74  ] Well developed, well nourished, no distress  Respiratory:   Effort unlabored; Movements symmetric  GI:   No mass or hernia noted, not distended or tender   No enlargement of spleen or liver noted  Skin:   No pallor or cyanosis; No obvious rash  Psych:   Alert, Oriented x 3         Lab Results   Component Value Date    GLUCOSE 103 (H) 11/13/2023    BUN 22 11/13/2023    CREATININE 1.35 (H) 11/13/2023    BCR 16.3 11/13/2023    CO2 28.0 11/13/2023    CALCIUM 9.5 11/13/2023    ALBUMIN 3.2 (L) " "11/13/2023    AST 11 11/13/2023    ALT 16 11/13/2023     Lab Results   Component Value Date    GLUCOSE 103 (H) 11/13/2023    CALCIUM 9.5 11/13/2023     11/13/2023    K 4.3 11/13/2023    CO2 28.0 11/13/2023     11/13/2023    BUN 22 11/13/2023    CREATININE 1.35 (H) 11/13/2023    BCR 16.3 11/13/2023    ANIONGAP 8.0 11/13/2023     Lab Results   Component Value Date    WBC 12.48 (H) 11/13/2023    HGB 13.0 11/13/2023    HCT 39.6 11/13/2023    MCV 93.0 11/13/2023     11/13/2023     No results found for: \"PSA\"  No results found for: \"URINECX\"  Brief Urine Lab Results  (Last result in the past 365 days)        Color   Clarity   Blood   Leuk Est   Nitrite   Protein   CREAT   Urine HCG        11/12/23 1752 Dark Yellow   Clear   Large (3+)   Trace   Negative   30 mg/dL (1+)                   Imaging Results (Last 7 Days)       Procedure Component Value Units Date/Time    CT Abdomen Pelvis Stone Protocol [[id number]] Collected: 11/12/23 1831     Updated: 11/12/23 1839    Narrative:      EXAM: CT ABDOMEN PELVIS STONE PROTOCOL-     INDICATION: Flank pain, kidney stone suspected        TECHNIQUE: Helically acquired CT images were obtained of the abdomen and  pelvis without intravenous contrast. Coronal and sagittal reformations  were performed.        DOSE LENGTH PRODUCT: 417 mGy cm. Automated exposure control was also  utilized to decrease patient radiation dose.     COMPARISON: 8/7/2023.     FINDINGS:     Lower Chest: Unremarkable.     Liver: Unremarkable.     Biliary Tree: Unremarkable.     Spleen: Unremarkable.     Pancreas: Unremarkable.     Adrenal Glands: Unremarkable.     Kidneys/Ureters/Bladder: 5 mm calculus in the distal right ureter with  moderate upstream hydronephrosis. Mild right perinephric stranding.  Punctate renal calculi in the interpole of the right kidney.     Reproductive Organs: Unremarkable.     Gastrointestinal Tract: Unremarkable.      Lymphatics: Unremarkable.     Vasculature: Mild " atherosclerosis.     Peritoneum/Retroperitoneum: No additional findings.     Abdominal Wall/Soft Tissues: Tiny fat-containing umbilical hernia. Small  fat-containing inguinal hernias.     Osseous Structures: No acute or suspicious osseous finding.       Impression:            5 mm calculus in the distal right ureter with moderate upstream  hydronephrosis.         Punctate calculi in the lower pole of the right kidney.     This report was signed and finalized on 11/12/2023 6:36 PM CST by Ortega Shook.           I reviewed these images.  He does have a 5 mm stones below the iliac vessels in the distal ureter.  Renal parenchyma looks good.  There is definitely some perinephric inflammation.  No urinoma noted      Assessment and Plan  Right distal ureteral calculus with obstruction  Acute kidney injury probably secondary to obstruction and dehydration secondary to nausea and vomiting    -He has an absolute indication and need for urgent procedure to relieve the obstruction. The patient has a right distal ureteral stone as defined by symptoms and radiographic studies reviewed with the patient.  Further evaluation will include stone analysis, retrograde pyelogram,, and postoperative renal ultrasound to be sure the obstruction has resolved.  Options for the management of such stone is discussed based upon size of stone, location, symptoms, and probable composition including watchful waiting, ESWL, ureteroscopy , use of ureteral stenting, percutaneous management, and open surgery.  Based upon this discussion we have elected to proceed with ureteroscopic management of the stone.  The patient understands that a laser or other fragmentation device may be needed.  The need for ureteral stenting including its required removal was discussed.  Risks of pain, discomfort from stent, ureteral stricture that may occur in the future, inability to render stone free, ureteral avulsion/perforation are all discussed.  I explained that  in extreme cases this could lead to loss of function of the affected kidney.    (Please note that portions of this note were completed with a voice recognition program.)  Abdelrahman Aguirre MD  11/13/23  07:40 CST

## 2023-11-13 NOTE — PROGRESS NOTES
[facility] Medicine Services  INPATIENT PROGRESS NOTE    Patient Name: Cecil Saucedo  Date of Admission: 11/12/2023  Today's Date: 11/13/23  Length of Stay: 1  Primary Care Physician: Cecil Tatum APRN    Subjective   Chief Complaint: Ureteral stone, ADRIANA follow  HPI   Patient examined lying in bed on room air in no acute distress, wife at bedside.  He complains of dysuria, he also notes hematuria postoperatively.  I explained this was to be expected.  He tells me his discomfort is much improved compared to preoperatively.  Stent was placed per Dr. Aguirre.  Continue to monitor dysuria today.  Continue IV fluids with hopeful ADRIANA resolved.  If hematuria stabilizes and creatinine improved, hopeful discharge home tomorrow.        Review of Systems   All pertinent negatives and positives are as above. All other systems have been reviewed and are negative unless otherwise stated.     Objective    Temp:  [97.5 °F (36.4 °C)-98.6 °F (37 °C)] 97.7 °F (36.5 °C)  Heart Rate:  [55-94] 94  Resp:  [12-18] 14  BP: (106-149)/() 143/69  Physical Exam  Vitals and nursing note reviewed.   Constitutional:       General: He is not in acute distress.     Appearance: Normal appearance. He is not ill-appearing.      Comments: Lying in bed, no acute distress, wife at bedside   HENT:      Head: Normocephalic and atraumatic.   Cardiovascular:      Rate and Rhythm: Normal rate and regular rhythm.      Pulses: Normal pulses.      Heart sounds: Normal heart sounds. No murmur heard.  Pulmonary:      Effort: Pulmonary effort is normal. No respiratory distress.      Breath sounds: Normal breath sounds. No wheezing or rhonchi.   Chest:      Chest wall: No tenderness.   Abdominal:      General: Bowel sounds are normal. There is no distension.      Palpations: Abdomen is soft.      Tenderness: There is no abdominal tenderness.   Genitourinary:     Comments: Hematuria.  Patient voiding per  urinal  Musculoskeletal:         General: No swelling or deformity. Normal range of motion.      Cervical back: Normal range of motion. No rigidity or tenderness.      Right lower leg: No edema.      Left lower leg: No edema.   Skin:     General: Skin is warm and dry.      Capillary Refill: Capillary refill takes less than 2 seconds.      Coloration: Skin is not pale.      Findings: No erythema.   Neurological:      General: No focal deficit present.      Mental Status: He is alert and oriented to person, place, and time. Mental status is at baseline.      Sensory: No sensory deficit.      Motor: No weakness.   Psychiatric:         Mood and Affect: Mood normal.         Behavior: Behavior normal.         Thought Content: Thought content normal.         Judgment: Judgment normal.       Results Review:  I have reviewed the labs, radiology results, and diagnostic studies.    Laboratory Data:   Results from last 7 days   Lab Units 11/13/23  0403 11/12/23  1742   WBC 10*3/mm3 12.48* 16.60*   HEMOGLOBIN g/dL 13.0 15.3   HEMATOCRIT % 39.6 45.7   PLATELETS 10*3/mm3 307 354        Results from last 7 days   Lab Units 11/13/23  0403 11/12/23  1742   SODIUM mmol/L 143 141   POTASSIUM mmol/L 4.3 3.9   CHLORIDE mmol/L 107 104   CO2 mmol/L 28.0 26.0   BUN mg/dL 22 24*   CREATININE mg/dL 1.35* 2.32*   CALCIUM mg/dL 9.5 9.8   BILIRUBIN mg/dL 0.3 0.3   ALK PHOS U/L 51 61   ALT (SGPT) U/L 16 20   AST (SGOT) U/L 11 15   GLUCOSE mg/dL 103* 113*     Radiology Data:   Imaging Results (Last 24 Hours)       Procedure Component Value Units Date/Time    FL Pyelogram Retrograde Bilateral in OR [[id number]] Resulted: 11/13/23 0846     Updated: 11/13/23 0932    CT Abdomen Pelvis Stone Protocol [[id number]] Collected: 11/12/23 1831     Updated: 11/12/23 1839    Narrative:      EXAM: CT ABDOMEN PELVIS STONE PROTOCOL-     INDICATION: Flank pain, kidney stone suspected        TECHNIQUE: Helically acquired CT images were obtained of the abdomen  and  pelvis without intravenous contrast. Coronal and sagittal reformations  were performed.        DOSE LENGTH PRODUCT: 417 mGy cm. Automated exposure control was also  utilized to decrease patient radiation dose.     COMPARISON: 8/7/2023.     FINDINGS:     Lower Chest: Unremarkable.     Liver: Unremarkable.     Biliary Tree: Unremarkable.     Spleen: Unremarkable.     Pancreas: Unremarkable.     Adrenal Glands: Unremarkable.     Kidneys/Ureters/Bladder: 5 mm calculus in the distal right ureter with  moderate upstream hydronephrosis. Mild right perinephric stranding.  Punctate renal calculi in the interpole of the right kidney.     Reproductive Organs: Unremarkable.     Gastrointestinal Tract: Unremarkable.      Lymphatics: Unremarkable.     Vasculature: Mild atherosclerosis.     Peritoneum/Retroperitoneum: No additional findings.     Abdominal Wall/Soft Tissues: Tiny fat-containing umbilical hernia. Small  fat-containing inguinal hernias.     Osseous Structures: No acute or suspicious osseous finding.       Impression:            5 mm calculus in the distal right ureter with moderate upstream  hydronephrosis.         Punctate calculi in the lower pole of the right kidney.     This report was signed and finalized on 11/12/2023 6:36 PM CST by Ortega Shook.               I have reviewed the patient's current medications.     Assessment/Plan   Assessment  Active Hospital Problems    Diagnosis     **Acute kidney injury     Right ureteral stone        Treatment Plan  Right ureteral stone-  Identified on CT imaging  Dr. Aguirre with urology consulted and performed cystoscopy with stent insertion on 11/13.  Continue to monitor hematuria  Continue Flomax  Urology plans scope for stone extraction in 1 to 2 weeks.  CBC in a.m.    Urinalysis showing negative nitrites, trace leukocytes, no bacteria.  Leukocytosis downward trending.  Hold off on antibiotics at this time unless indicated per urology.  Continue to monitor.  CBC  in a.m.    ADRIANA-  Creatinine on 11/2 was 0.95  Creatinine on arrival 2.32, improved to 1.35  Ureteral stone removed  Continue IV fluids  BMP in a.m.    Hypertension-  Continue PTA losartan-Norvasc    GERD-  Protonix in place of PTA Prilosec    SCDs for DVT prophylaxis    Medical Decision Making  Number and Complexity of problems: 1 acute problem of high complexity and right ureteral stone.  1 acute problem of high complexity and acute kidney injury.  1 chronic problem of low complexity and GERD.  1 chronic problem of moderate complexity in hypertension.  Differential Diagnosis: None    Conditions and Status        Status stable     MDM Data  External documents reviewed: None  Cardiac tracing (EKG, telemetry) interpretation: EKG reviewed  Radiology interpretation: CT abdomen pelvis reviewed per radiologist interpretation  Labs reviewed: CBC, BMP, urinalysis  Any tests that were considered but not ordered: None     Decision rules/scores evaluated (example [id number], Wells, etc): None      Discussed with: Patient, Dr. Delatorre     Care Planning  Shared decision making: Discussed with above, patient is agreeable to his plan of care  Code status and discussions: Full    Disposition  Social Determinants of Health that impact treatment or disposition: None  I expect the patient to be discharged to home in 1-2 days.     Electronically signed by YOAV Zacarias, 11/13/23, 12:43 CST.

## 2023-11-13 NOTE — PLAN OF CARE
Goal Outcome Evaluation:      Patient received form ED last night. CO pain in groin. NPO at midnight for cysto today. No complaints of pain overnight. Urine strained. No stones. IVF inf. Safety maintained.

## 2023-11-13 NOTE — H&P
[facility] Medicine Services  HISTORY AND PHYSICAL    Date of Admission: 11/12/2023  Primary Care Physician: Cecil Tatum APRN    Subjective   Primary Historian: Patient    Chief Complaint: Right groin pain    Abdominal Pain  Associated symptoms include arthralgias, nausea and vomiting. Pertinent negatives include no fever.   58-year-old male who presents emergency department with complaints of nausea and vomiting.  He states that after lunch he started feeling really bad.  He vomited 2-3 times.  He started hurting in his right lower groin area.  He felt like he was kicked in that area.  He has been struggling to make urine.  He does not have history of kidney stones in the past, but has seen Dr. Aguirre for a known stone.  When asked review of systems, he states he has had a lot of symptoms lately.  He initially started with chest pain that went through to his back and then into his legs and then he has had knee pain.  He states steroids makes everything go away.  He has had multiple MRI scans.  He does appear to take chronic steroids.  The patient states that currently he does not have a diagnosis for this.  He has no lower extremity edema.  He notes that he has had some chills.        Review of Systems   Constitutional:  Positive for chills. Negative for fever.   Gastrointestinal:  Positive for abdominal pain, nausea and vomiting.   Genitourinary:  Positive for difficulty urinating.   Musculoskeletal:  Positive for arthralgias.      Otherwise complete ROS reviewed and negative except as mentioned in the HPI.    Past Medical History:   Past Medical History:   Diagnosis Date    Acute kidney injury 11/12/2023    Allergic conjunctivitis     Allergic rhinitis     Cholelithiasis     Chronic sinusitis     Deviated septum     GERD (gastroesophageal reflux disease)     Hypertension     Hypertrophy of nasal turbinates     Refractory obstruction of nasal airway     Rhinitis  medicamentosa      Past Surgical History:  Past Surgical History:   Procedure Laterality Date    ADENOIDECTOMY      COLONOSCOPY  10/24/2013    normal exam excpetion hemorrhoids repeat 5 year    COLONOSCOPY N/A 11/9/2023    Procedure: COLONOSCOPY WITH ANESTHESIA;  Surgeon: Ian Woodard MD;  Location:  [facility];  Service: Gastroenterology;  Laterality: N/A;  pre epigastric pain  post polyps  DShane Rust    ENDOSCOPY N/A 11/9/2023    Procedure: ESOPHAGOGASTRODUODENOSCOPY WITH ANESTHESIA;  Surgeon: Ian Woodard MD;  Location:  [facility];  Service: Gastroenterology;  Laterality: N/A;  pre  post      KNEE SURGERY      NECK SURGERY      SHOULDER SURGERY      SINUS SURGERY  2010    kest, FESS, ITR    TONSILLECTOMY       Social History:  reports that he has never smoked. He uses smokeless tobacco. He reports that he does not drink alcohol and does not use drugs.    Family History: family history includes No Known Problems in his father and mother.       Allergies:  No Known Allergies    Medications:  Prior to Admission medications    Medication Sig Start Date End Date Taking? Authorizing Provider   gabapentin (NEURONTIN) 100 MG capsule Take 1 capsule by mouth 3 (Three) Times a Day. 11/3/23  Yes Jensen Mcnair MD   amlodipine-olmesartan (ANGEL) 10-40 MG per tablet  8/1/22   Jensen Mcnair MD   omeprazole (priLOSEC) 20 MG capsule Take 1 capsule by mouth Daily.    Jensen Mcnair MD   polyethylene glycol (GoLYTELY) 236 g solution Take as directed by office instructions. 11/2/23   Екатерина Carter APRN   predniSONE (DELTASONE) 1 MG tablet Take 10 tablets by mouth Daily.    Jensen Mcnair MD   vitamin D (ERGOCALCIFEROL) 1.25 MG (87406 UT) capsule capsule Take 1 capsule by mouth 1 (One) Time Per Week. 8/8/23   Jensen Mcnair MD     I have utilized all available immediate resources to obtain, update, or review the patient's current medications (including all  "prescriptions, over-the-counter products, herbals, cannabis/cannabidiol products, and vitamin/mineral/dietary (nutritional) supplements).    Objective     Vital Signs: /82   Pulse 72   Temp 98.6 °F (37 °C) (Oral)   Resp 18   Ht 167.6 cm (65.98\")   Wt 88 kg (194 lb)   SpO2 94%   BMI 31.33 kg/m²   Physical Exam  Vitals reviewed.   Constitutional:       Appearance: Normal appearance.   HENT:      Head: Normocephalic and atraumatic.      Right Ear: External ear normal.      Left Ear: External ear normal.      Nose: Nose normal.   Eyes:      General: No scleral icterus.     Conjunctiva/sclera: Conjunctivae normal.   Cardiovascular:      Rate and Rhythm: Normal rate and regular rhythm.      Heart sounds: Normal heart sounds.   Pulmonary:      Effort: Pulmonary effort is normal.      Breath sounds: Normal breath sounds.   Abdominal:      General: There is no distension.      Palpations: Abdomen is soft.      Tenderness: There is no abdominal tenderness.   Musculoskeletal:         General: No swelling or tenderness.      Cervical back: Normal range of motion and neck supple.   Skin:     General: Skin is warm and dry.   Neurological:      Mental Status: He is alert and oriented to person, place, and time.      Cranial Nerves: No cranial nerve deficit.   Psychiatric:         Mood and Affect: Mood normal.         Behavior: Behavior normal.          Results Reviewed:  Lab Results (last 24 hours)       Procedure Component Value Units Date/Time    Urinalysis With Culture If Indicated - Urine, Clean Catch [[id number]]  (Abnormal) Collected: 11/12/23 1752    Specimen: Urine, Clean Catch Updated: 11/12/23 1819     Color, UA Dark Yellow     Appearance, UA Clear     pH, UA 5.5     Specific Gravity, UA >1.030     Glucose, UA Negative     Ketones, UA 15 mg/dL (1+)     Bilirubin, UA Small (1+)     Blood, UA Large (3+)     Protein, UA 30 mg/dL (1+)     Leuk Esterase, UA Trace     Nitrite, UA Negative     Urobilinogen, UA 1.0 " E.U./dL    Narrative:      In absence of clinical symptoms, the presence of pyuria, bacteria, and/or nitrites on the urinalysis result does not correlate with infection.    Urinalysis, Microscopic Only - Urine, Clean Catch [[id number]]  (Abnormal) Collected: 11/12/23 1752    Specimen: Urine, Clean Catch Updated: 11/12/23 1819     RBC, UA 11-20 /HPF      WBC, UA 3-5 /HPF      Comment: Urine culture not indicated.        Bacteria, UA None Seen /HPF      Squamous Epithelial Cells, UA 0-2 /HPF      Hyaline Casts, UA None Seen /LPF      Calcium Oxalate Crystals, UA Moderate/2+ /HPF      Mucus, UA Small/1+ /HPF      Methodology Manual Light Microscopy    Comprehensive Metabolic Panel [[id number]]  (Abnormal) Collected: 11/12/23 1742    Specimen: Blood Updated: 11/12/23 1810     Glucose 113 mg/dL      BUN 24 mg/dL      Creatinine 2.32 mg/dL      Sodium 141 mmol/L      Potassium 3.9 mmol/L      Comment: Slight hemolysis detected by analyzer. Result may be falsely elevated.        Chloride 104 mmol/L      CO2 26.0 mmol/L      Calcium 9.8 mg/dL      Total Protein 7.0 g/dL      Albumin 4.2 g/dL      ALT (SGPT) 20 U/L      AST (SGOT) 15 U/L      Alkaline Phosphatase 61 U/L      Total Bilirubin 0.3 mg/dL      Globulin 2.8 gm/dL      A/G Ratio 1.5 g/dL      BUN/Creatinine Ratio 10.3     Anion Gap 11.0 mmol/L      eGFR 31.4 mL/min/1.73     Narrative:      GFR Normal >60  Chronic Kidney Disease <60  Kidney Failure <15      Lipase [[id number]]  (Normal) Collected: 11/12/23 1742    Specimen: Blood Updated: 11/12/23 1805     Lipase 51 U/L     Protime-INR [[id number]]  (Normal) Collected: 11/12/23 1742    Specimen: Blood Updated: 11/12/23 1803     Protime 13.0 Seconds      INR 0.98    CBC & Differential [[id number]]  (Abnormal) Collected: 11/12/23 1742    Specimen: Blood Updated: 11/12/23 1752    Narrative:      The following orders were created for panel order CBC & Differential.  Procedure                               Abnormality          Status                     ---------                               -----------         ------                     CBC Auto Differential[[id number]]        Abnormal            Final result                 Please view results for these tests on the individual orders.    CBC Auto Differential [[id number]]  (Abnormal) Collected: 11/12/23 1742    Specimen: Blood Updated: 11/12/23 1752     WBC 16.60 10*3/mm3      RBC 5.00 10*6/mm3      Hemoglobin 15.3 g/dL      Hematocrit 45.7 %      MCV 91.4 fL      MCH 30.6 pg      MCHC 33.5 g/dL      RDW 13.9 %      RDW-SD 46.8 fl      MPV 8.8 fL      Platelets 354 10*3/mm3      Neutrophil % 81.7 %      Lymphocyte % 7.1 %      Monocyte % 7.5 %      Eosinophil % 0.5 %      Basophil % 0.7 %      Immature Grans % 2.5 %      Neutrophils, Absolute 13.55 10*3/mm3      Lymphocytes, Absolute 1.18 10*3/mm3      Monocytes, Absolute 1.25 10*3/mm3      Eosinophils, Absolute 0.08 10*3/mm3      Basophils, Absolute 0.12 10*3/mm3      Immature Grans, Absolute 0.42 10*3/mm3      nRBC 0.0 /100 WBC           Imaging Results (Last 24 Hours)       Procedure Component Value Units Date/Time    CT Abdomen Pelvis Stone Protocol [[id number]] Collected: 11/12/23 1831     Updated: 11/12/23 1839    Narrative:      EXAM: CT ABDOMEN PELVIS STONE PROTOCOL-     INDICATION: Flank pain, kidney stone suspected        TECHNIQUE: Helically acquired CT images were obtained of the abdomen and  pelvis without intravenous contrast. Coronal and sagittal reformations  were performed.        DOSE LENGTH PRODUCT: 417 mGy cm. Automated exposure control was also  utilized to decrease patient radiation dose.     COMPARISON: 8/7/2023.     FINDINGS:     Lower Chest: Unremarkable.     Liver: Unremarkable.     Biliary Tree: Unremarkable.     Spleen: Unremarkable.     Pancreas: Unremarkable.     Adrenal Glands: Unremarkable.     Kidneys/Ureters/Bladder: 5 mm calculus in the distal right ureter with  moderate upstream hydronephrosis. Mild  right perinephric stranding.  Punctate renal calculi in the interpole of the right kidney.     Reproductive Organs: Unremarkable.     Gastrointestinal Tract: Unremarkable.      Lymphatics: Unremarkable.     Vasculature: Mild atherosclerosis.     Peritoneum/Retroperitoneum: No additional findings.     Abdominal Wall/Soft Tissues: Tiny fat-containing umbilical hernia. Small  fat-containing inguinal hernias.     Osseous Structures: No acute or suspicious osseous finding.       Impression:            5 mm calculus in the distal right ureter with moderate upstream  hydronephrosis.         Punctate calculi in the lower pole of the right kidney.     This report was signed and finalized on 11/12/2023 6:36 PM CST by Ortega Shook.             I have personally reviewed and interpreted the radiology studies and ECG obtained at time of admission.     Assessment / Plan   Assessment:   Active Hospital Problems    Diagnosis     **Acute kidney injury      Impression:  ARF  Right ureteral stone, 5 mm  Hypertension  Arthralgia, on chronic prednisone    Treatment Plan  Admit to observation  Consult urology  Flomax  Strict I/O  IV fluids  Follow-up labs in the morning  Prior to surgical procedure, may need to stress dose steroids, secondary to chronic use    The patient will be admitted to my service here at [facility].  Primary team to take over the morning    Medical Decision Making  Number and Complexity of problems: 4, complex  Differential Diagnosis: UTI    Conditions and Status        Condition is unchanged.     [facility] Data  External documents reviewed: None  Cardiac tracing (EKG, telemetry) interpretation: None  Radiology interpretation: None  Labs reviewed: Reviewed  Any tests that were considered but not ordered: None     Decision rules/scores evaluated (example [id number], Wells, etc): None     Discussed with: Patient and wife at bedside     Care Planning  Shared decision making: Patient, wife, and ED  staff  Code status and discussions: Full    Disposition  Social Determinants of Health that impact treatment or disposition: None  Estimated length of stay is overnight.     I confirmed that the patient's advanced care plan is present, code status is documented, and a surrogate decision maker is listed in the patient's medical record.     The patient's surrogate decision maker is family.     The patient was seen and examined by me on 11/12/2023 at 730.    Electronically signed by Elizabeth Roblero DO, 11/12/23, 19:56 CST.

## 2023-11-14 ENCOUNTER — TELEPHONE (OUTPATIENT)
Dept: UROLOGY | Facility: CLINIC | Age: 58
End: 2023-11-14
Payer: COMMERCIAL

## 2023-11-14 ENCOUNTER — PREP FOR SURGERY (OUTPATIENT)
Dept: OTHER | Facility: HOSPITAL | Age: 58
End: 2023-11-14
Payer: COMMERCIAL

## 2023-11-14 VITALS
WEIGHT: 192.4 LBS | TEMPERATURE: 97.9 F | HEIGHT: 66 IN | OXYGEN SATURATION: 94 % | HEART RATE: 72 BPM | DIASTOLIC BLOOD PRESSURE: 68 MMHG | SYSTOLIC BLOOD PRESSURE: 127 MMHG | RESPIRATION RATE: 16 BRPM | BODY MASS INDEX: 30.92 KG/M2

## 2023-11-14 DIAGNOSIS — N20.1 RIGHT URETERAL CALCULUS: Primary | ICD-10-CM

## 2023-11-14 LAB
ANION GAP SERPL CALCULATED.3IONS-SCNC: 9 MMOL/L (ref 5–15)
BUN SERPL-MCNC: 19 MG/DL (ref 8–23)
BUN/CREAT SERPL: 18.3 (ref 7–25)
CALCIUM SPEC-SCNC: 9.1 MG/DL (ref 8.6–10.5)
CHLORIDE SERPL-SCNC: 105 MMOL/L (ref 98–107)
CO2 SERPL-SCNC: 28 MMOL/L (ref 22–29)
CREAT SERPL-MCNC: 1.04 MG/DL (ref 0.76–1.27)
DEPRECATED RDW RBC AUTO: 47 FL (ref 37–54)
EGFRCR SERPLBLD CKD-EPI 2021: 82.2 ML/MIN/1.73
ERYTHROCYTE [DISTWIDTH] IN BLOOD BY AUTOMATED COUNT: 13.8 % (ref 12.3–15.4)
GLUCOSE SERPL-MCNC: 118 MG/DL (ref 65–99)
HCT VFR BLD AUTO: 38.7 % (ref 37.5–51)
HGB BLD-MCNC: 12.8 G/DL (ref 13–17.7)
MCH RBC QN AUTO: 31.1 PG (ref 26.6–33)
MCHC RBC AUTO-ENTMCNC: 33.1 G/DL (ref 31.5–35.7)
MCV RBC AUTO: 93.9 FL (ref 79–97)
PLATELET # BLD AUTO: 284 10*3/MM3 (ref 140–450)
PMV BLD AUTO: 9 FL (ref 6–12)
POTASSIUM SERPL-SCNC: 4.1 MMOL/L (ref 3.5–5.2)
RBC # BLD AUTO: 4.12 10*6/MM3 (ref 4.14–5.8)
SODIUM SERPL-SCNC: 142 MMOL/L (ref 136–145)
WBC NRBC COR # BLD: 15.25 10*3/MM3 (ref 3.4–10.8)

## 2023-11-14 PROCEDURE — 99232 SBSQ HOSP IP/OBS MODERATE 35: CPT | Performed by: UROLOGY

## 2023-11-14 PROCEDURE — 25010000002 CEFTRIAXONE PER 250 MG

## 2023-11-14 PROCEDURE — 63710000001 PREDNISONE PER 5 MG: Performed by: UROLOGY

## 2023-11-14 PROCEDURE — 25810000003 LACTATED RINGERS PER 1000 ML: Performed by: UROLOGY

## 2023-11-14 PROCEDURE — 80048 BASIC METABOLIC PNL TOTAL CA: CPT

## 2023-11-14 PROCEDURE — 85027 COMPLETE CBC AUTOMATED: CPT

## 2023-11-14 RX ORDER — TAMSULOSIN HYDROCHLORIDE 0.4 MG/1
0.4 CAPSULE ORAL 2 TIMES DAILY
Qty: 60 CAPSULE | Refills: 0 | Status: SHIPPED | OUTPATIENT
Start: 2023-11-14

## 2023-11-14 RX ORDER — CEFDINIR 300 MG/1
300 CAPSULE ORAL 2 TIMES DAILY
Qty: 8 CAPSULE | Refills: 0 | Status: SHIPPED | OUTPATIENT
Start: 2023-11-15 | End: 2023-11-19

## 2023-11-14 RX ADMIN — LOSARTAN POTASSIUM 100 MG: 50 TABLET, FILM COATED ORAL at 08:37

## 2023-11-14 RX ADMIN — CEFTRIAXONE SODIUM 2000 MG: 2 INJECTION, POWDER, FOR SOLUTION INTRAMUSCULAR; INTRAVENOUS at 09:50

## 2023-11-14 RX ADMIN — PANTOPRAZOLE SODIUM 40 MG: 40 TABLET, DELAYED RELEASE ORAL at 06:26

## 2023-11-14 RX ADMIN — AMLODIPINE BESYLATE 10 MG: 10 TABLET ORAL at 08:37

## 2023-11-14 RX ADMIN — SODIUM CHLORIDE, POTASSIUM CHLORIDE, SODIUM LACTATE AND CALCIUM CHLORIDE 100 ML/HR: 600; 310; 30; 20 INJECTION, SOLUTION INTRAVENOUS at 06:26

## 2023-11-14 RX ADMIN — GABAPENTIN 100 MG: 100 CAPSULE ORAL at 08:37

## 2023-11-14 RX ADMIN — TAMSULOSIN HYDROCHLORIDE 0.4 MG: 0.4 CAPSULE ORAL at 08:37

## 2023-11-14 RX ADMIN — PREDNISONE 10 MG: 10 TABLET ORAL at 08:37

## 2023-11-14 RX ADMIN — PHENAZOPYRIDINE HYDROCHLORIDE 200 MG: 200 TABLET ORAL at 08:37

## 2023-11-14 NOTE — TELEPHONE ENCOUNTER
Called and let the wife know Dr. Aguirre is not in office on the 27th and does not operate on the 28th. Wife verbalized understanding.

## 2023-11-14 NOTE — PLAN OF CARE
Goal Outcome Evaluation:  R ureteral Stent placed 11/13 for r ureteral stone (5mm) by Md Aguirre; pt denies pain or need for pain meds; pt placed on flomax and pyridium; voiding per urinal and urine being filtered with no stones seen so far  ADRIANA - Cr 2.32 on 11/12; IVF LR @ 100 ml/hr

## 2023-11-14 NOTE — DISCHARGE SUMMARY
[facility] Medicine Services  DISCHARGE SUMMARY       Date of Admission: 11/12/2023  Date of Discharge:  11/14/2023  Primary Care Physician: Cecil Tatum APRN    Presenting Problem/History of Present Illness:  ADRIANA secondary to ureteral stone    Final Discharge Diagnoses:  Active Hospital Problems    Diagnosis     **Acute kidney injury     Right ureteral stone        Consults: Dr. Aguirre with urology    Procedures Performed: Cystoscopy with stent insertion    Pertinent Test Results:       Imaging Results (All)       Procedure Component Value Units Date/Time    FL Pyelogram Retrograde Bilateral in OR [[id number]] Collected: 11/13/23 1314     Updated: 11/13/23 1319    Narrative:      FL PYELOGRAM RETROGRADE BILATERAL IN OR- 11/13/2023 8:46 AM CST     HISTORY: stone/stent; N17.9-Acute kidney failure, unspecified;  N23-Unspecified renal colic     COMPARISON: None     FLUOROSCOPY TIME: 2.7 minutes     FLUOROSCOPY DOSE: 20.4 mGy     NUMBER OF IMAGES: 5       Impression:         Intraoperative fluoroscopic images during cystoscopy with bilateral  retrograde pyelograms. A right-sided ureteral stent is in good position  on the last provided image. An area of focal narrowing in the mid to  distal right ureter is noted which may be related to the calculus  demonstrated on prior CT.     Please refer to the operative note for more details.     This report was signed and finalized on 11/13/2023 1:16 PM CST by Dr. Alberto Espinoza MD.       CT Abdomen Pelvis Stone Protocol [[id number]] Collected: 11/12/23 1831     Updated: 11/12/23 1839    Narrative:      EXAM: CT ABDOMEN PELVIS STONE PROTOCOL-     INDICATION: Flank pain, kidney stone suspected        TECHNIQUE: Helically acquired CT images were obtained of the abdomen and  pelvis without intravenous contrast. Coronal and sagittal reformations  were performed.        DOSE LENGTH PRODUCT: 417 mGy cm. Automated exposure control was  also  utilized to decrease patient radiation dose.     COMPARISON: 8/7/2023.     FINDINGS:     Lower Chest: Unremarkable.     Liver: Unremarkable.     Biliary Tree: Unremarkable.     Spleen: Unremarkable.     Pancreas: Unremarkable.     Adrenal Glands: Unremarkable.     Kidneys/Ureters/Bladder: 5 mm calculus in the distal right ureter with  moderate upstream hydronephrosis. Mild right perinephric stranding.  Punctate renal calculi in the interpole of the right kidney.     Reproductive Organs: Unremarkable.     Gastrointestinal Tract: Unremarkable.      Lymphatics: Unremarkable.     Vasculature: Mild atherosclerosis.     Peritoneum/Retroperitoneum: No additional findings.     Abdominal Wall/Soft Tissues: Tiny fat-containing umbilical hernia. Small  fat-containing inguinal hernias.     Osseous Structures: No acute or suspicious osseous finding.       Impression:            5 mm calculus in the distal right ureter with moderate upstream  hydronephrosis.         Punctate calculi in the lower pole of the right kidney.     This report was signed and finalized on 11/12/2023 6:36 PM CST by Ortega Shook.             LAB RESULTS:      Lab 11/14/23  0708 11/13/23  0403 11/12/23  1742   WBC 15.25* 12.48* 16.60*   HEMOGLOBIN 12.8* 13.0 15.3   HEMATOCRIT 38.7 39.6 45.7   PLATELETS 284 307 354   NEUTROS ABS  --  8.27* 13.55*   IMMATURE GRANS (ABS)  --  0.29* 0.42*   LYMPHS ABS  --  2.36 1.18   MONOS ABS  --  1.25* 1.25*   EOS ABS  --  0.24 0.08   MCV 93.9 93.0 91.4   PROTIME  --   --  13.0         Lab 11/14/23  0708 11/13/23  0403 11/12/23  1742   SODIUM 142 143 141   POTASSIUM 4.1 4.3 3.9   CHLORIDE 105 107 104   CO2 28.0 28.0 26.0   ANION GAP 9.0 8.0 11.0   BUN 19 22 24*   CREATININE 1.04 1.35* 2.32*   EGFR 82.2 60.1 31.4*   GLUCOSE 118* 103* 113*   CALCIUM 9.1 9.5 9.8         Lab 11/13/23  0403 11/12/23  1742   TOTAL PROTEIN 5.7* 7.0   ALBUMIN 3.2* 4.2   GLOBULIN 2.5 2.8   ALT (SGPT) 16 20   AST (SGOT) 11 15   BILIRUBIN 0.3  0.3   ALK PHOS 51 61   LIPASE  --  51         Lab 11/12/23  1742   PROTIME 13.0   INR 0.98                 Brief Urine Lab Results  (Last result in the past 365 days)        Color   Clarity   Blood   Leuk Est   Nitrite   Protein   CREAT   Urine HCG        11/12/23 1752 Dark Yellow   Clear   Large (3+)   Trace   Negative   30 mg/dL (1+)                 Microbiology Results (last 10 days)       Procedure Component Value - Date/Time    Urease For H Pylori - Tissue, Stomach [[id number]]  (Normal) Collected: 11/09/23 0824    Lab Status: Final result Specimen: Tissue from Stomach Updated: 11/10/23 1131     Urease Negative            Hospital Course:   Patient presented to [facility] emergency department on 11/12/2023 with complaints of nausea, vomiting, discomfort in right lower quadrant/right groin area.  He was found to have a right ureteral stone and acute kidney injury.  He was admitted for further monitoring and management with urology consultation.    Dr. Aguirre with urology was consulted and performed cystoscopy with stent insertion on 11/13.  Patient had some mild hematuria postoperatively that subsequently resolved.  Patient was placed on Flomax and Pyridium.  Dr. Aguirre plans for stone extraction/lithotripsy in 2 weeks.  Patient will be contacted by Dr. Aguirre's office.    Urinalysis on arrival with trace leukocytes, no bacteria.  Patient underwent urological procedure and remains with dysuria.  WBC somewhat elevated, could be reactive, although given presence of stone and scope, will cover with antibiotic.  IV Rocephin x1 and cefdinir for 4 days thereafter.    Patient sustained ADRIANA secondary to right ureteral stone.  Creatinine on arrival was 2.32.  After stone removal and IV fluids, creatinine has normalized at 1.04-day of discharge.  Recommend BMP at soonest outpatient follow-up.    Day of discharge, patient is in no acute distress on room air.  He is eager for discharge home.  He has no further concerns  "or questions at this time.  I reviewed his discharge plan including short course of antibiotics and follow-up with urology in 2 weeks.  Patient will be contacted by Dr. Aguirre's office.  He expresses understanding and is agreeable to his discharge plan.    Recommend follow-up with PCP in 1 week  CBC, BMP at soonest outpatient follow-up.    Physical Exam on Discharge:  /68 (BP Location: Left arm, Patient Position: Sitting)   Pulse 72   Temp 97.9 °F (36.6 °C) (Oral)   Resp 16   Ht 167.6 cm (66\")   Wt 87.3 kg (192 lb 6.4 oz)   SpO2 94%   BMI 31.05 kg/m²   Physical Exam  Vitals and nursing note reviewed.   Constitutional:       General: He is not in acute distress.     Appearance: Normal appearance. He is not ill-appearing.      Comments: Lying in bed, no acute distress  HENT:      Head: Normocephalic and atraumatic.   Cardiovascular:      Rate and Rhythm: Normal rate and regular rhythm.      Pulses: Normal pulses.      Heart sounds: Normal heart sounds. No murmur heard.  Pulmonary:      Effort: Pulmonary effort is normal. No respiratory distress.      Breath sounds: Normal breath sounds. No wheezing or rhonchi.   Chest:      Chest wall: No tenderness.   Abdominal:      General: Bowel sounds are normal. There is no distension.      Palpations: Abdomen is soft.      Tenderness: There is no abdominal tenderness.   Genitourinary:     Comments: Gross hematuria resolved.  Patient voiding per urinal  Musculoskeletal:         General: No swelling or deformity. Normal range of motion.      Cervical back: Normal range of motion. No rigidity or tenderness.      Right lower leg: No edema.      Left lower leg: No edema.   Skin:     General: Skin is warm and dry.      Capillary Refill: Capillary refill takes less than 2 seconds.      Coloration: Skin is not pale.      Findings: No erythema.   Neurological:      General: No focal deficit present.      Mental Status: He is alert and oriented to person, place, and time. " Mental status is at baseline.      Sensory: No sensory deficit.      Motor: No weakness.   Psychiatric:         Mood and Affect: Mood normal.         Behavior: Behavior normal.         Thought Content: Thought content normal.         Judgment: Judgment normal.        Condition on Discharge: Stable    Discharge Disposition:  Home or Self Care    Discharge Medications:     Discharge Medications        New Medications        Instructions Start Date   cefdinir 300 MG capsule  Commonly known as: OMNICEF   300 mg, Oral, 2 Times Daily   Start Date: November 15, 2023     tamsulosin 0.4 MG capsule 24 hr capsule  Commonly known as: FLOMAX   0.4 mg, Oral, 2 Times Daily             Continue These Medications        Instructions Start Date   Hay 10-40 MG per tablet  Generic drug: amlodipine-olmesartan   1 tablet, Oral, Daily      gabapentin 100 MG capsule  Commonly known as: NEURONTIN   100 mg, Oral, 3 Times Daily      omeprazole 40 MG capsule  Commonly known as: priLOSEC   40 mg, Oral, Daily      predniSONE 1 MG tablet  Commonly known as: DELTASONE   10 mg, Oral, Daily             Discharge Diet:     Activity at Discharge:   Activity Instructions       Up WIth Assist              Follow-up Appointments:  PCP 1 week  Dr. Aguirre 2 weeks  No future appointments.    Test Results Pending at Discharge: None    Electronically signed by YOAV Zacarias, 11/14/23, 08:57 CST.    Time: 35 minutes.

## 2023-11-14 NOTE — PAYOR COMM NOTE
"REF:   [id number]     [facility]  FAX  [phone]     Cecil Dawson (58 y.o. Male)       Date of Birth   1965    Social Security Number       Address   [address] [place] [address]    Home Phone   [phone]    MRN   [id number]       [facility]    Marital Status                               Admission Date   11/12/23    Admission Type   Emergency    Admitting Provider   Marcia Delatorre    Attending Provider       Department, Room/Bed   [facility] 3C, 371/1       Discharge Date   11/14/2023    Discharge Disposition   Home or Self Care    Discharge Destination                                 Attending Provider: (none)   Allergies: No Known Allergies    Isolation: None   Infection: None   Code Status: CPR    Ht: 167.6 cm (66\")   Wt: 87.3 kg (192 lb 6.4 oz)    Admission Cmt: None   Principal Problem: Acute kidney injury [N17.9]                   Active Insurance as of 11/12/2023       Primary Coverage       Payor Plan Insurance Group Employer/Plan Group    [facility] [employer] [facility] [employer] [id number]       Payor Plan Address Payor Plan Phone Number Payor Plan Fax Number Effective Dates    [address] [phone]  8/1/2010 - None Entered    [place] [address]         Subscriber Name Subscriber Birth Date Member ID       ARABELLA DAWSON 4/12/1969 [id number]                     Emergency Contacts        (Rel.) Home Phone Work Phone Mobile Phone    Arabella Dawson (Spouse) [phone] -- --                 Discharge Summary        Giuseppe Haney APRN at 11/14/23 0846       Attestation signed by Marcia Delatorre at 11/14/23 1317    Reviewed.  Patient examined.  Patient has no new complaints.  He has been eating and drinking without difficulty.  His pain is controlled.    Lungs are clear to auscultation.    Cardiovascular regular rate and rhythm without murmur or gallop.  Abdomen soft bowel sounds present nontender " nondistended.    Discussed with patient as well as with YOAV Lyons.    I have reviewed this documentation and agree.  This visit was performed by both a physician and an APC. I personally evaluated and examined the patient. I performed all aspects of the MDM as documented.      Electronically signed by Marcia Delatorre, 11/14/2023, 13:16 CST.                        [facility]  DISCHARGE SUMMARY       Date of Admission: 11/12/2023  Date of Discharge:  11/14/2023  Primary Care Physician: Cecil Tatum APRN    Presenting Problem/History of Present Illness:  ADRIANA secondary to ureteral stone    Final Discharge Diagnoses:  Active Hospital Problems    Diagnosis     **Acute kidney injury     Right ureteral stone        Consults: Dr. Aguirre with urology    Procedures Performed: Cystoscopy with stent insertion    Pertinent Test Results:       Imaging Results (All)       Procedure Component Value Units Date/Time    FL Pyelogram Retrograde Bilateral in OR [[id number]] Collected: 11/13/23 1314     Updated: 11/13/23 1319    Narrative:      FL PYELOGRAM RETROGRADE BILATERAL IN OR- 11/13/2023 8:46 AM CST     HISTORY: stone/stent; N17.9-Acute kidney failure, unspecified;  N23-Unspecified renal colic     COMPARISON: None     FLUOROSCOPY TIME: 2.7 minutes     FLUOROSCOPY DOSE: 20.4 mGy     NUMBER OF IMAGES: 5       Impression:         Intraoperative fluoroscopic images during cystoscopy with bilateral  retrograde pyelograms. A right-sided ureteral stent is in good position  on the last provided image. An area of focal narrowing in the mid to  distal right ureter is noted which may be related to the calculus  demonstrated on prior CT.     Please refer to the operative note for more details.     This report was signed and finalized on 11/13/2023 1:16 PM CST by Dr. Alberto Espinoza MD.       CT Abdomen Pelvis Stone Protocol [[id number]] Collected: 11/12/23 5881     Updated:  11/12/23 1839    Narrative:      EXAM: CT ABDOMEN PELVIS STONE PROTOCOL-     INDICATION: Flank pain, kidney stone suspected        TECHNIQUE: Helically acquired CT images were obtained of the abdomen and  pelvis without intravenous contrast. Coronal and sagittal reformations  were performed.        DOSE LENGTH PRODUCT: 417 mGy cm. Automated exposure control was also  utilized to decrease patient radiation dose.     COMPARISON: 8/7/2023.     FINDINGS:     Lower Chest: Unremarkable.     Liver: Unremarkable.     Biliary Tree: Unremarkable.     Spleen: Unremarkable.     Pancreas: Unremarkable.     Adrenal Glands: Unremarkable.     Kidneys/Ureters/Bladder: 5 mm calculus in the distal right ureter with  moderate upstream hydronephrosis. Mild right perinephric stranding.  Punctate renal calculi in the interpole of the right kidney.     Reproductive Organs: Unremarkable.     Gastrointestinal Tract: Unremarkable.      Lymphatics: Unremarkable.     Vasculature: Mild atherosclerosis.     Peritoneum/Retroperitoneum: No additional findings.     Abdominal Wall/Soft Tissues: Tiny fat-containing umbilical hernia. Small  fat-containing inguinal hernias.     Osseous Structures: No acute or suspicious osseous finding.       Impression:            5 mm calculus in the distal right ureter with moderate upstream  hydronephrosis.         Punctate calculi in the lower pole of the right kidney.     This report was signed and finalized on 11/12/2023 6:36 PM CST by Ortega Shook.             LAB RESULTS:      Lab 11/14/23  0708 11/13/23  0403 11/12/23  1742   WBC 15.25* 12.48* 16.60*   HEMOGLOBIN 12.8* 13.0 15.3   HEMATOCRIT 38.7 39.6 45.7   PLATELETS 284 307 354   NEUTROS ABS  --  8.27* 13.55*   IMMATURE GRANS (ABS)  --  0.29* 0.42*   LYMPHS ABS  --  2.36 1.18   MONOS ABS  --  1.25* 1.25*   EOS ABS  --  0.24 0.08   MCV 93.9 93.0 91.4   PROTIME  --   --  13.0         Lab 11/14/23  0708 11/13/23  0403 11/12/23  1742   SODIUM 142 143 141    POTASSIUM 4.1 4.3 3.9   CHLORIDE 105 107 104   CO2 28.0 28.0 26.0   ANION GAP 9.0 8.0 11.0   BUN 19 22 24*   CREATININE 1.04 1.35* 2.32*   EGFR 82.2 60.1 31.4*   GLUCOSE 118* 103* 113*   CALCIUM 9.1 9.5 9.8         Lab 11/13/23  0403 11/12/23  1742   TOTAL PROTEIN 5.7* 7.0   ALBUMIN 3.2* 4.2   GLOBULIN 2.5 2.8   ALT (SGPT) 16 20   AST (SGOT) 11 15   BILIRUBIN 0.3 0.3   ALK PHOS 51 61   LIPASE  --  51         Lab 11/12/23  1742   PROTIME 13.0   INR 0.98                 Brief Urine Lab Results  (Last result in the past 365 days)        Color   Clarity   Blood   Leuk Est   Nitrite   Protein   CREAT   Urine HCG        11/12/23 1752 Dark Yellow   Clear   Large (3+)   Trace   Negative   30 mg/dL (1+)                 Microbiology Results (last 10 days)       Procedure Component Value - Date/Time    Urease For H Pylori - Tissue, Stomach [[id number]]  (Normal) Collected: 11/09/23 0824    Lab Status: Final result Specimen: Tissue from Stomach Updated: 11/10/23 1131     Urease Negative            Hospital Course:   Patient presented to [facility] emergency department on 11/12/2023 with complaints of nausea, vomiting, discomfort in right lower quadrant/right groin area.  He was found to have a right ureteral stone and acute kidney injury.  He was admitted for further monitoring and management with urology consultation.    Dr. Aguirre with urology was consulted and performed cystoscopy with stent insertion on 11/13.  Patient had some mild hematuria postoperatively that subsequently resolved.  Patient was placed on Flomax and Pyridium.  Dr. Aguirre plans for stone extraction/lithotripsy in 2 weeks.  Patient will be contacted by Dr. Aguirre's office.    Urinalysis on arrival with trace leukocytes, no bacteria.  Patient underwent urological procedure and remains with dysuria.  WBC somewhat elevated, could be reactive, although given presence of stone and scope, will cover with antibiotic.  IV Rocephin x1 and cefdinir for 4 days  "thereafter.    Patient sustained ADRIANA secondary to right ureteral stone.  Creatinine on arrival was 2.32.  After stone removal and IV fluids, creatinine has normalized at 1.04-day of discharge.  Recommend BMP at soonest outpatient follow-up.    Day of discharge, patient is in no acute distress on room air.  He is eager for discharge home.  He has no further concerns or questions at this time.  I reviewed his discharge plan including short course of antibiotics and follow-up with urology in 2 weeks.  Patient will be contacted by Dr. Aguirre's office.  He expresses understanding and is agreeable to his discharge plan.    Recommend follow-up with PCP in 1 week  CBC, BMP at soonest outpatient follow-up.    Physical Exam on Discharge:  /68 (BP Location: Left arm, Patient Position: Sitting)   Pulse 72   Temp 97.9 °F (36.6 °C) (Oral)   Resp 16   Ht 167.6 cm (66\")   Wt 87.3 kg (192 lb 6.4 oz)   SpO2 94%   BMI 31.05 kg/m²   Physical Exam  Vitals and nursing note reviewed.   Constitutional:       General: He is not in acute distress.     Appearance: Normal appearance. He is not ill-appearing.      Comments: Lying in bed, no acute distress  HENT:      Head: Normocephalic and atraumatic.   Cardiovascular:      Rate and Rhythm: Normal rate and regular rhythm.      Pulses: Normal pulses.      Heart sounds: Normal heart sounds. No murmur heard.  Pulmonary:      Effort: Pulmonary effort is normal. No respiratory distress.      Breath sounds: Normal breath sounds. No wheezing or rhonchi.   Chest:      Chest wall: No tenderness.   Abdominal:      General: Bowel sounds are normal. There is no distension.      Palpations: Abdomen is soft.      Tenderness: There is no abdominal tenderness.   Genitourinary:     Comments: Gross hematuria resolved.  Patient voiding per urinal  Musculoskeletal:         General: No swelling or deformity. Normal range of motion.      Cervical back: Normal range of motion. No rigidity or tenderness. "      Right lower leg: No edema.      Left lower leg: No edema.   Skin:     General: Skin is warm and dry.      Capillary Refill: Capillary refill takes less than 2 seconds.      Coloration: Skin is not pale.      Findings: No erythema.   Neurological:      General: No focal deficit present.      Mental Status: He is alert and oriented to person, place, and time. Mental status is at baseline.      Sensory: No sensory deficit.      Motor: No weakness.   Psychiatric:         Mood and Affect: Mood normal.         Behavior: Behavior normal.         Thought Content: Thought content normal.         Judgment: Judgment normal.        Condition on Discharge: Stable    Discharge Disposition:  Home or Self Care    Discharge Medications:     Discharge Medications        New Medications        Instructions Start Date   cefdinir 300 MG capsule  Commonly known as: OMNICEF   300 mg, Oral, 2 Times Daily   Start Date: November 15, 2023     tamsulosin 0.4 MG capsule 24 hr capsule  Commonly known as: FLOMAX   0.4 mg, Oral, 2 Times Daily             Continue These Medications        Instructions Start Date   Hay 10-40 MG per tablet  Generic drug: amlodipine-olmesartan   1 tablet, Oral, Daily      gabapentin 100 MG capsule  Commonly known as: NEURONTIN   100 mg, Oral, 3 Times Daily      omeprazole 40 MG capsule  Commonly known as: priLOSEC   40 mg, Oral, Daily      predniSONE 1 MG tablet  Commonly known as: DELTASONE   10 mg, Oral, Daily             Discharge Diet:     Activity at Discharge:   Activity Instructions       Up WIth Assist              Follow-up Appointments:  PCP 1 week  Dr. Aguirre 2 weeks  No future appointments.    Test Results Pending at Discharge: None    Electronically signed by YOAV Zacarias, 11/14/23, 08:57 CST.    Time: 35 minutes.          Electronically signed by Marcia Delatorre at 11/14/23 1317       Discharge Order (From admission, onward)       Start     Ordered    11/14/23 0856  Discharge patient   Once        Expected Discharge Date: 11/14/23   Discharge Disposition: Home or Self Care   Physician of Record for Attribution - Please select from Treatment Team: VON LOWE [[id number]]   Review needed by CMO to determine Physician of Record: No      Question Answer Comment   Physician of Record for Attribution - Please select from Treatment Team VON LOWE    Review needed by CMO to determine Physician of Record No        11/14/23 0855

## 2023-11-14 NOTE — PLAN OF CARE
Goal Outcome Evaluation:      Pt is A&Ox4 and afebrile. He went for a cystoscopy today and had a stent placed. He has had adequate urine output though it is bloody. He has LR going at 100mL/hr. Urine has been filtered and no stone has been seen.

## 2023-11-14 NOTE — PROGRESS NOTES
Urology  Length of Stay: 2  Patient Care Team:  Cecil Tatum APRN as PCP - General (Family Medicine)  Jacob Sharma MD as Consulting Physician (Otolaryngology)    Chief Complaint: Right distal ureteral calculus with obstruction and acute kidney injury    Subjective     Interval History:   Feels much better after stent placement.  Some discomfort with urinating.  No hematuria.    Review of Systems:   Review of Systems  Nausea and vomiting have subsided    Objective       Intake/Output Summary (Last 24 hours) at 11/14/2023 0822  Last data filed at 11/14/2023 0628  Gross per 24 hour   Intake 3407.67 ml   Output 2100 ml   Net 1307.67 ml                   Physical Exam:  Temp:  [97.5 °F (36.4 °C)-98.2 °F (36.8 °C)] 97.9 °F (36.6 °C)  Heart Rate:  [55-94] 72  Resp:  [12-18] 16  BP: (108-147)/(43-85) 127/68  Constitutional:   Temp:  [97.5 °F (36.4 °C)-98.2 °F (36.8 °C)] 97.9 °F (36.6 °C)  Heart Rate:  [55-94] 72  Resp:  [12-18] 16  BP: (108-147)/(43-85) 127/68  ] Well developed, well nourished, no distress  Respiratory:   Effort unlabored; Movements symmetric  GI:   No mass or hernia noted, not distended or tender   No enlargement of spleen or liver noted  Skin:   No pallor or cyanosis; No obvious rash  Psych:   Alert, Oriented x 3            Results Review:       I reviewed the patient's new clinical results.  Lab Results (last 24 hours)       Procedure Component Value Units Date/Time    Basic Metabolic Panel [[id number]]  (Abnormal) Collected: 11/14/23 0708    Specimen: Blood Updated: 11/14/23 0733     Glucose 118 mg/dL      BUN 19 mg/dL      Creatinine 1.04 mg/dL      Sodium 142 mmol/L      Potassium 4.1 mmol/L      Chloride 105 mmol/L      CO2 28.0 mmol/L      Calcium 9.1 mg/dL      BUN/Creatinine Ratio 18.3     Anion Gap 9.0 mmol/L      eGFR 82.2 mL/min/1.73     Narrative:      GFR Normal >60  Chronic Kidney Disease <60  Kidney Failure <15      CBC (No Diff) [[id number]]  (Abnormal) Collected: 11/14/23  0708    Specimen: Blood Updated: 11/14/23 0716     WBC 15.25 10*3/mm3      RBC 4.12 10*6/mm3      Hemoglobin 12.8 g/dL      Hematocrit 38.7 %      MCV 93.9 fL      MCH 31.1 pg      MCHC 33.1 g/dL      RDW 13.8 %      RDW-SD 47.0 fl      MPV 9.0 fL      Platelets 284 10*3/mm3           Imaging Results (Last 24 Hours)       Procedure Component Value Units Date/Time    FL Pyelogram Retrograde Bilateral in OR [[id number]] Collected: 11/13/23 1314     Updated: 11/13/23 1319    Narrative:      FL PYELOGRAM RETROGRADE BILATERAL IN OR- 11/13/2023 8:46 AM CST     HISTORY: stone/stent; N17.9-Acute kidney failure, unspecified;  N23-Unspecified renal colic     COMPARISON: None     FLUOROSCOPY TIME: 2.7 minutes     FLUOROSCOPY DOSE: 20.4 mGy     NUMBER OF IMAGES: 5       Impression:         Intraoperative fluoroscopic images during cystoscopy with bilateral  retrograde pyelograms. A right-sided ureteral stent is in good position  on the last provided image. An area of focal narrowing in the mid to  distal right ureter is noted which may be related to the calculus  demonstrated on prior CT.     Please refer to the operative note for more details.     This report was signed and finalized on 11/13/2023 1:16 PM CST by Dr. Alberto Espinoza MD.               Medication Review:     Current Facility-Administered Medications:     acetaminophen (TYLENOL) tablet 650 mg, 650 mg, Oral, Q4H PRN, Abdelrahman Aguirre MD    losartan (COZAAR) tablet 100 mg, 100 mg, Oral, Q24H, 100 mg at 11/13/23 1051 **AND** amLODIPine (NORVASC) tablet 10 mg, 10 mg, Oral, Q24H, Abdelrahman Aguirre MD, 10 mg at 11/13/23 1051    [START ON 11/15/2023] cefTRIAXone (ROCEPHIN) 2,000 mg in sodium chloride 0.9 % 100 mL IVPB, 2,000 mg, Intravenous, Once, Giuseppe Haney APRN    gabapentin (NEURONTIN) capsule 100 mg, 100 mg, Oral, TID, Abdelrahman Aguirre MD, 100 mg at 11/13/23 2008    HYDROmorphone (DILAUDID) injection 0.5 mg, 0.5 mg, Intravenous, Q2H PRN, Abdelrahman Aguirre,  MD    hyoscyamine (LEVSIN) SL tablet 125 mcg, 125 mcg, Sublingual, Q4H PRN, Abdelrahman Aguirre MD    lactated ringers infusion, 100 mL/hr, Intravenous, Continuous, Abdelrahman Aguirre MD, Last Rate: 100 mL/hr at 11/14/23 0626, 100 mL/hr at 11/14/23 0626    ondansetron (ZOFRAN) injection 4 mg, 4 mg, Intravenous, Q6H PRN, Abdelrahman Aguirre MD    oxyCODONE-acetaminophen (PERCOCET) 5-325 MG per tablet 1 tablet, 1 tablet, Oral, Q8H PRN, Abdelrahman Aguirre MD    pantoprazole (PROTONIX) EC tablet 40 mg, 40 mg, Oral, Q AM, Abdelrahman Aguirre MD, 40 mg at 11/14/23 0626    phenazopyridine (PYRIDIUM) tablet 200 mg, 200 mg, Oral, TID With Meals, Abdelrahman Aguirre MD, 200 mg at 11/13/23 1719    predniSONE (DELTASONE) tablet 10 mg, 10 mg, Oral, Daily, Abdelrahman Aguirre MD, 10 mg at 11/13/23 1051    [COMPLETED] Insert Peripheral IV, , , Once **AND** sodium chloride 0.9 % flush 10 mL, 10 mL, Intravenous, PRN, Abdelrahman Aguirre MD    sodium chloride 0.9 % flush 10 mL, 10 mL, Intravenous, Q12H, Abdelrahman Aguirre MD, 10 mL at 11/13/23 1052    sodium chloride 0.9 % flush 10 mL, 10 mL, Intravenous, PRN, Abdelrahman Aguirre MD    sodium chloride 0.9 % infusion 40 mL, 40 mL, Intravenous, PRN, Abdelrahman Aguirre MD    tamsulosin (FLOMAX) 24 hr capsule 0.4 mg, 0.4 mg, Oral, BID, Abdelrahman Aguirre MD, 0.4 mg at 11/13/23 2008    Assessment/Plan:   Right distal ureteral calculus with obstruction  Acute kidney injury    - Plan ureteroscopy with laser lithotripsy in two weeks.  We will continue ureteral stent until then.  Okay to KAREL from my standpoint.  My office will contact with follow-up instructions for surgery.      (Please note that portions of this note were completed with a voice recognition program.)  Abdelrahman Aguirre MD  11/14/23  08:22 CST

## 2023-11-20 ENCOUNTER — TELEPHONE (OUTPATIENT)
Dept: UROLOGY | Facility: CLINIC | Age: 58
End: 2023-11-20
Payer: COMMERCIAL

## 2023-11-20 NOTE — TELEPHONE ENCOUNTER
Spoke to pt.'s wife, informed her elevated WBC is normal following procedure and not concerning unless pt. Were to develop a fever >101. Informed her Dr. Aguirre will still do surgery on the 29th as planned and to let us know or seek treatment at PCP, Urgent Care or ER if after hours if pt. Develops a fever >101 or if he is unable to urinate. She v/u.       ----- Message from Abdelrahman Aguirre MD sent at 11/20/2023  1:05 PM CST -----  It is common for patients' WBC to increase after any procedure.  It is of no concern unless his temp is going over 101. I definitely don't think the stent should come out.   ----- Message -----  From: Adan Amaya RN  Sent: 11/20/2023  12:22 PM CST  To: Abdelrahman Aguirre MD    Pt.'s wife called bc PCP is concerned about his white count being 12.7 on his labs from last Thursday and they thought his stent needed to come out sooner, I informed her that is lower than what it was leaving the hospital on Tuesday. I indexed those labs to the chart if you would like to review. Pt. Was on 4 days of Omnicef from PCP that he just completed so she is wondering if he needs more antibiotics.

## 2023-11-28 ENCOUNTER — TELEPHONE (OUTPATIENT)
Dept: UROLOGY | Facility: CLINIC | Age: 58
End: 2023-11-28
Payer: COMMERCIAL

## 2023-11-28 NOTE — TELEPHONE ENCOUNTER
Called patient to remind them to arrive at patient registration on 11/29 at 0630 for the procedure with Dr. Aguirre. Spoke with patient. Told patient if they had any questions to please contact our office at [phone].

## 2023-11-29 ENCOUNTER — HOSPITAL ENCOUNTER (OUTPATIENT)
Facility: HOSPITAL | Age: 58
Setting detail: HOSPITAL OUTPATIENT SURGERY
Discharge: HOME | End: 2023-11-29
Attending: UROLOGY | Admitting: UROLOGY
Payer: COMMERCIAL

## 2023-11-29 ENCOUNTER — ANESTHESIA (OUTPATIENT)
Dept: PERIOP | Facility: HOSPITAL | Age: 58
End: 2023-11-29
Payer: COMMERCIAL

## 2023-11-29 ENCOUNTER — ANESTHESIA EVENT (OUTPATIENT)
Dept: PERIOP | Facility: HOSPITAL | Age: 58
End: 2023-11-29
Payer: COMMERCIAL

## 2023-11-29 ENCOUNTER — APPOINTMENT (OUTPATIENT)
Dept: GENERAL RADIOLOGY | Facility: HOSPITAL | Age: 58
End: 2023-11-29
Payer: COMMERCIAL

## 2023-11-29 VITALS
WEIGHT: 194 LBS | BODY MASS INDEX: 30.45 KG/M2 | RESPIRATION RATE: 16 BRPM | OXYGEN SATURATION: 96 % | HEART RATE: 55 BPM | TEMPERATURE: 98.2 F | DIASTOLIC BLOOD PRESSURE: 73 MMHG | SYSTOLIC BLOOD PRESSURE: 102 MMHG | HEIGHT: 67 IN

## 2023-11-29 DIAGNOSIS — N20.1 RIGHT URETERAL CALCULUS: ICD-10-CM

## 2023-11-29 DIAGNOSIS — N20.1 RIGHT URETERAL STONE: Primary | ICD-10-CM

## 2023-11-29 PROCEDURE — 88300 SURGICAL PATH GROSS: CPT | Performed by: UROLOGY

## 2023-11-29 PROCEDURE — 25010000002 DEXAMETHASONE PER 1 MG: Performed by: ANESTHESIOLOGY

## 2023-11-29 PROCEDURE — C1769 GUIDE WIRE: HCPCS | Performed by: UROLOGY

## 2023-11-29 PROCEDURE — 25010000002 FENTANYL CITRATE (PF) 100 MCG/2ML SOLUTION: Performed by: NURSE ANESTHETIST, CERTIFIED REGISTERED

## 2023-11-29 PROCEDURE — 25010000002 ONDANSETRON PER 1 MG: Performed by: NURSE ANESTHETIST, CERTIFIED REGISTERED

## 2023-11-29 PROCEDURE — 25010000002 CEFAZOLIN PER 500 MG: Performed by: UROLOGY

## 2023-11-29 PROCEDURE — 25510000001 IOPAMIDOL 61 % SOLUTION: Performed by: UROLOGY

## 2023-11-29 PROCEDURE — 74018 RADEX ABDOMEN 1 VIEW: CPT

## 2023-11-29 PROCEDURE — 76000 FLUOROSCOPY <1 HR PHYS/QHP: CPT

## 2023-11-29 PROCEDURE — 25010000002 DEXAMETHASONE PER 1 MG: Performed by: NURSE ANESTHETIST, CERTIFIED REGISTERED

## 2023-11-29 PROCEDURE — 25010000002 PROPOFOL 10 MG/ML EMULSION: Performed by: NURSE ANESTHETIST, CERTIFIED REGISTERED

## 2023-11-29 PROCEDURE — 82360 CALCULUS ASSAY QUANT: CPT | Performed by: UROLOGY

## 2023-11-29 PROCEDURE — C2617 STENT, NON-COR, TEM W/O DEL: HCPCS | Performed by: UROLOGY

## 2023-11-29 PROCEDURE — 25810000003 LACTATED RINGERS PER 1000 ML: Performed by: UROLOGY

## 2023-11-29 DEVICE — URETERAL STENT WITH SIDE HOLES 6FX24CM
Type: IMPLANTABLE DEVICE | Site: URETER | Status: FUNCTIONAL
Brand: TRIA™ SOFT

## 2023-11-29 RX ORDER — FENTANYL CITRATE 50 UG/ML
25 INJECTION, SOLUTION INTRAMUSCULAR; INTRAVENOUS
Status: DISCONTINUED | OUTPATIENT
Start: 2023-11-29 | End: 2023-11-29 | Stop reason: HOSPADM

## 2023-11-29 RX ORDER — PROPOFOL 10 MG/ML
VIAL (ML) INTRAVENOUS AS NEEDED
Status: DISCONTINUED | OUTPATIENT
Start: 2023-11-29 | End: 2023-11-29 | Stop reason: SURG

## 2023-11-29 RX ORDER — HYDROCODONE BITARTRATE AND ACETAMINOPHEN 5; 325 MG/1; MG/1
1 TABLET ORAL ONCE AS NEEDED
Status: DISCONTINUED | OUTPATIENT
Start: 2023-11-29 | End: 2023-11-29 | Stop reason: HOSPADM

## 2023-11-29 RX ORDER — NALOXONE HCL 0.4 MG/ML
0.4 VIAL (ML) INJECTION AS NEEDED
Status: DISCONTINUED | OUTPATIENT
Start: 2023-11-29 | End: 2023-11-29 | Stop reason: HOSPADM

## 2023-11-29 RX ORDER — SCOLOPAMINE TRANSDERMAL SYSTEM 1 MG/1
1 PATCH, EXTENDED RELEASE TRANSDERMAL ONCE
Status: DISCONTINUED | OUTPATIENT
Start: 2023-11-29 | End: 2023-11-29 | Stop reason: HOSPADM

## 2023-11-29 RX ORDER — FLUMAZENIL 0.1 MG/ML
0.2 INJECTION INTRAVENOUS AS NEEDED
Status: DISCONTINUED | OUTPATIENT
Start: 2023-11-29 | End: 2023-11-29 | Stop reason: HOSPADM

## 2023-11-29 RX ORDER — ONDANSETRON 2 MG/ML
4 INJECTION INTRAMUSCULAR; INTRAVENOUS ONCE AS NEEDED
Status: DISCONTINUED | OUTPATIENT
Start: 2023-11-29 | End: 2023-11-29 | Stop reason: HOSPADM

## 2023-11-29 RX ORDER — CEPHALEXIN 500 MG/1
500 CAPSULE ORAL 3 TIMES DAILY
Qty: 6 CAPSULE | Refills: 0 | Status: SHIPPED | OUTPATIENT
Start: 2023-11-29

## 2023-11-29 RX ORDER — SODIUM CHLORIDE 0.9 % (FLUSH) 0.9 %
3-10 SYRINGE (ML) INJECTION AS NEEDED
Status: DISCONTINUED | OUTPATIENT
Start: 2023-11-29 | End: 2023-11-29 | Stop reason: HOSPADM

## 2023-11-29 RX ORDER — MAGNESIUM HYDROXIDE 1200 MG/15ML
LIQUID ORAL AS NEEDED
Status: DISCONTINUED | OUTPATIENT
Start: 2023-11-29 | End: 2023-11-29 | Stop reason: HOSPADM

## 2023-11-29 RX ORDER — ROCURONIUM BROMIDE 10 MG/ML
INJECTION, SOLUTION INTRAVENOUS AS NEEDED
Status: DISCONTINUED | OUTPATIENT
Start: 2023-11-29 | End: 2023-11-29 | Stop reason: SURG

## 2023-11-29 RX ORDER — HYDROCODONE BITARTRATE AND ACETAMINOPHEN 10; 325 MG/1; MG/1
1 TABLET ORAL EVERY 4 HOURS PRN
Status: DISCONTINUED | OUTPATIENT
Start: 2023-11-29 | End: 2023-11-29 | Stop reason: HOSPADM

## 2023-11-29 RX ORDER — DEXAMETHASONE SODIUM PHOSPHATE 4 MG/ML
INJECTION, SOLUTION INTRA-ARTICULAR; INTRALESIONAL; INTRAMUSCULAR; INTRAVENOUS; SOFT TISSUE AS NEEDED
Status: DISCONTINUED | OUTPATIENT
Start: 2023-11-29 | End: 2023-11-29 | Stop reason: SURG

## 2023-11-29 RX ORDER — SODIUM CHLORIDE, SODIUM LACTATE, POTASSIUM CHLORIDE, CALCIUM CHLORIDE 600; 310; 30; 20 MG/100ML; MG/100ML; MG/100ML; MG/100ML
100 INJECTION, SOLUTION INTRAVENOUS CONTINUOUS
Status: DISCONTINUED | OUTPATIENT
Start: 2023-11-29 | End: 2023-11-29 | Stop reason: HOSPADM

## 2023-11-29 RX ORDER — MIDAZOLAM HYDROCHLORIDE 1 MG/ML
1 INJECTION INTRAMUSCULAR; INTRAVENOUS
Status: DISCONTINUED | OUTPATIENT
Start: 2023-11-29 | End: 2023-11-29 | Stop reason: HOSPADM

## 2023-11-29 RX ORDER — SODIUM CHLORIDE 9 MG/ML
40 INJECTION, SOLUTION INTRAVENOUS AS NEEDED
Status: DISCONTINUED | OUTPATIENT
Start: 2023-11-29 | End: 2023-11-29 | Stop reason: HOSPADM

## 2023-11-29 RX ORDER — ONDANSETRON 2 MG/ML
INJECTION INTRAMUSCULAR; INTRAVENOUS AS NEEDED
Status: DISCONTINUED | OUTPATIENT
Start: 2023-11-29 | End: 2023-11-29 | Stop reason: SURG

## 2023-11-29 RX ORDER — HYDROCODONE BITARTRATE AND ACETAMINOPHEN 5; 325 MG/1; MG/1
1 TABLET ORAL EVERY 6 HOURS PRN
Qty: 12 TABLET | Refills: 0 | Status: SHIPPED | OUTPATIENT
Start: 2023-11-29 | End: 2023-12-02

## 2023-11-29 RX ORDER — SUCCINYLCHOLINE/SOD CL,ISO/PF 200MG/10ML
SYRINGE (ML) INTRAVENOUS AS NEEDED
Status: DISCONTINUED | OUTPATIENT
Start: 2023-11-29 | End: 2023-11-29 | Stop reason: SURG

## 2023-11-29 RX ORDER — SODIUM CHLORIDE 0.9 % (FLUSH) 0.9 %
3 SYRINGE (ML) INJECTION EVERY 12 HOURS SCHEDULED
Status: DISCONTINUED | OUTPATIENT
Start: 2023-11-29 | End: 2023-11-29 | Stop reason: HOSPADM

## 2023-11-29 RX ORDER — NEOSTIGMINE METHYLSULFATE 5 MG/5 ML
SYRINGE (ML) INTRAVENOUS AS NEEDED
Status: DISCONTINUED | OUTPATIENT
Start: 2023-11-29 | End: 2023-11-29 | Stop reason: SURG

## 2023-11-29 RX ORDER — FENTANYL CITRATE 50 UG/ML
INJECTION, SOLUTION INTRAMUSCULAR; INTRAVENOUS AS NEEDED
Status: DISCONTINUED | OUTPATIENT
Start: 2023-11-29 | End: 2023-11-29 | Stop reason: SURG

## 2023-11-29 RX ORDER — SODIUM CHLORIDE, SODIUM LACTATE, POTASSIUM CHLORIDE, CALCIUM CHLORIDE 600; 310; 30; 20 MG/100ML; MG/100ML; MG/100ML; MG/100ML
1000 INJECTION, SOLUTION INTRAVENOUS CONTINUOUS
Status: DISCONTINUED | OUTPATIENT
Start: 2023-11-29 | End: 2023-11-29 | Stop reason: HOSPADM

## 2023-11-29 RX ORDER — FAMOTIDINE 10 MG/ML
20 INJECTION, SOLUTION INTRAVENOUS
Status: COMPLETED | OUTPATIENT
Start: 2023-11-29 | End: 2023-11-29

## 2023-11-29 RX ORDER — DROPERIDOL 2.5 MG/ML
0.62 INJECTION, SOLUTION INTRAMUSCULAR; INTRAVENOUS ONCE AS NEEDED
Status: DISCONTINUED | OUTPATIENT
Start: 2023-11-29 | End: 2023-11-29 | Stop reason: HOSPADM

## 2023-11-29 RX ORDER — IBUPROFEN 600 MG/1
600 TABLET ORAL ONCE AS NEEDED
Status: DISCONTINUED | OUTPATIENT
Start: 2023-11-29 | End: 2023-11-29 | Stop reason: HOSPADM

## 2023-11-29 RX ORDER — DEXAMETHASONE SODIUM PHOSPHATE 4 MG/ML
4 INJECTION, SOLUTION INTRA-ARTICULAR; INTRALESIONAL; INTRAMUSCULAR; INTRAVENOUS; SOFT TISSUE ONCE AS NEEDED
Status: COMPLETED | OUTPATIENT
Start: 2023-11-29 | End: 2023-11-29

## 2023-11-29 RX ORDER — LIDOCAINE HYDROCHLORIDE 10 MG/ML
0.5 INJECTION, SOLUTION EPIDURAL; INFILTRATION; INTRACAUDAL; PERINEURAL ONCE AS NEEDED
Status: DISCONTINUED | OUTPATIENT
Start: 2023-11-29 | End: 2023-11-29 | Stop reason: HOSPADM

## 2023-11-29 RX ORDER — LABETALOL HYDROCHLORIDE 5 MG/ML
5 INJECTION, SOLUTION INTRAVENOUS
Status: DISCONTINUED | OUTPATIENT
Start: 2023-11-29 | End: 2023-11-29 | Stop reason: HOSPADM

## 2023-11-29 RX ORDER — SODIUM CHLORIDE 0.9 % (FLUSH) 0.9 %
3 SYRINGE (ML) INJECTION AS NEEDED
Status: DISCONTINUED | OUTPATIENT
Start: 2023-11-29 | End: 2023-11-29 | Stop reason: HOSPADM

## 2023-11-29 RX ORDER — ACETAMINOPHEN 500 MG
1000 TABLET ORAL ONCE
Status: COMPLETED | OUTPATIENT
Start: 2023-11-29 | End: 2023-11-29

## 2023-11-29 RX ADMIN — DEXAMETHASONE SODIUM PHOSPHATE 4 MG: 4 INJECTION INTRA-ARTICULAR; INTRALESIONAL; INTRAMUSCULAR; INTRAVENOUS; SOFT TISSUE at 08:38

## 2023-11-29 RX ADMIN — SCOPALAMINE 1 PATCH: 1 PATCH, EXTENDED RELEASE TRANSDERMAL at 08:37

## 2023-11-29 RX ADMIN — FENTANYL CITRATE 100 MCG: 50 INJECTION, SOLUTION INTRAMUSCULAR; INTRAVENOUS at 09:06

## 2023-11-29 RX ADMIN — DEXAMETHASONE SODIUM PHOSPHATE 4 MG: 4 INJECTION, SOLUTION INTRA-ARTICULAR; INTRALESIONAL; INTRAMUSCULAR; INTRAVENOUS; SOFT TISSUE at 09:26

## 2023-11-29 RX ADMIN — GLYCOPYRROLATE 0.4 MG: 0.2 INJECTION INTRAMUSCULAR; INTRAVENOUS at 09:34

## 2023-11-29 RX ADMIN — ACETAMINOPHEN 1000 MG: 500 TABLET, FILM COATED ORAL at 08:37

## 2023-11-29 RX ADMIN — ONDANSETRON 4 MG: 2 INJECTION INTRAMUSCULAR; INTRAVENOUS at 09:26

## 2023-11-29 RX ADMIN — FAMOTIDINE 20 MG: 10 INJECTION INTRAVENOUS at 08:38

## 2023-11-29 RX ADMIN — SODIUM CHLORIDE, POTASSIUM CHLORIDE, SODIUM LACTATE AND CALCIUM CHLORIDE: 600; 310; 30; 20 INJECTION, SOLUTION INTRAVENOUS at 08:54

## 2023-11-29 RX ADMIN — PROPOFOL 200 MG: 10 INJECTION, EMULSION INTRAVENOUS at 09:06

## 2023-11-29 RX ADMIN — ROCURONIUM BROMIDE 20 MG: 10 INJECTION INTRAVENOUS at 09:11

## 2023-11-29 RX ADMIN — Medication 3 MG: at 09:34

## 2023-11-29 RX ADMIN — CEFAZOLIN 2000 MG: 2 INJECTION, POWDER, FOR SOLUTION INTRAMUSCULAR; INTRAVENOUS at 09:11

## 2023-11-29 RX ADMIN — Medication 140 MG: at 09:06

## 2023-11-29 RX ADMIN — ROCURONIUM BROMIDE 5 MG: 10 INJECTION INTRAVENOUS at 09:06

## 2023-11-29 NOTE — INTERVAL H&P NOTE
H&P updated. The patient was examined and the following changes are noted:  Patient returns today for definitive management of stone.  I did place stent due to distal ureteral narrowing.  Plan ureteroscopic laser lithotripsy today.  Risks and benefits have been discussed.  Patient voiced no additional questions today.

## 2023-11-29 NOTE — ANESTHESIA PREPROCEDURE EVALUATION
Anesthesia Evaluation     Patient summary reviewed   history of anesthetic complications:  PONV  NPO Solid Status: > 8 hours  NPO Liquid Status: > 2 hours           Airway   Mallampati: I  TM distance: >3 FB  Neck ROM: full  No difficulty expected  Dental      Pulmonary    (-) asthma, sleep apnea, not a smoker  Cardiovascular   Exercise tolerance: good (4-7 METS)    (+) hypertension  (-) past MI, CAD, dysrhythmias, cardiac stents    ROS comment: Pt reports chronic pain under left chest  He has been evaluated by pcp  Pain is worse with movement and worse in morning  Scan showed pancreatic cyst cyst, he was evaluated by gi who felt this was incidental  He is able to point to the spot, just left of sternum. Pain is constant  He has been placed on prednisone, currently reports 4 mg daily dose    Neuro/Psych  (-) seizures, TIA, CVA  GI/Hepatic/Renal/Endo    (+) GERD, renal disease- stones  (-) liver disease, diabetes    ROS Comment:       Musculoskeletal     Abdominal    Substance History      OB/GYN          Other                      Anesthesia Plan    ASA 2     general     intravenous induction     Anesthetic plan, risks, benefits, and alternatives have been provided, discussed and informed consent has been obtained with: patient.      CODE STATUS:

## 2023-11-29 NOTE — ANESTHESIA PROCEDURE NOTES
Airway  Urgency: elective    Date/Time: 11/29/2023 9:06 AM  Airway not difficult    General Information and Staff    Patient location during procedure: OR  CRNA/CAA: Cecil Barakat CRNA    Indications and Patient Condition  Indications for airway management: airway protection    Preoxygenated: yes  Mask difficulty assessment: 1 - vent by mask    Final Airway Details  Final airway type: endotracheal airway      Successful airway: ETT  Cuffed: yes   Successful intubation technique: video laryngoscopy  Facilitating devices/methods: intubating stylet  Endotracheal tube insertion site: oral  Blade: Robb  Blade size: 4  ETT size (mm): 8.0  Cormack-Lehane Classification: grade I - full view of glottis  Placement verified by: chest auscultation and capnometry   Measured from: lips  ETT/EBT  to lips (cm): 22  Number of attempts at approach: 1  Assessment: lips, teeth, and gum same as pre-op and atraumatic intubation

## 2023-11-29 NOTE — ANESTHESIA POSTPROCEDURE EVALUATION
"Patient: Cecil Saucedo    Procedure Summary       Date: 11/29/23 Room / Location:  [facility] OR 01 /  [facility] OR    Anesthesia Start: 0904 Anesthesia Stop: 0944    Procedure: URETEROSCOPY LASER LITHOTRIPSY WITH STENT EXCHANGE (Right: Ureter) Diagnosis:       Right ureteral calculus      (Right ureteral calculus [N20.1])    Surgeons: Abdelrahman Aguirre MD Provider: Cecil Barakat CRNA    Anesthesia Type: general ASA Status: 2            Anesthesia Type: general    Vitals  Vitals Value Taken Time   /64 11/29/23 1031   Temp 98.2 °F (36.8 °C) 11/29/23 0943   Pulse 52 11/29/23 1040   Resp 14 11/29/23 1030   SpO2 94 % 11/29/23 1040   Vitals shown include unfiled device data.        Post Anesthesia Care and Evaluation    Patient location during evaluation: PHASE II  Patient participation: complete - patient participated  Level of consciousness: awake and awake and alert  Pain score: 0  Pain management: adequate    Airway patency: patent  Anesthetic complications: No anesthetic complications  PONV Status: none  Cardiovascular status: acceptable  Respiratory status: acceptable  Hydration status: acceptable    Comments: Patient discharged according to acceptable Edenilson score per RN assessment. See nursing records for further information.     Blood pressure 110/81, pulse 58, temperature 98.2 °F (36.8 °C), temperature source Temporal, resp. rate 16, height 170 cm (66.93\"), weight 88 kg (194 lb 0.1 oz), SpO2 95%.      "

## 2023-11-29 NOTE — OP NOTE
"Operative Summary    Cecil Saucedo  Date of Procedure: 11/29/2023    Pre-op Diagnosis:   Right ureteral calculus [N20.1]    Post-op Diagnosis:     Post-Op Diagnosis Codes:     * Right ureteral calculus [N20.1]    Procedure/CPT® Codes:      Procedure(s):  RIGHT URETEROSCOPY LASER LITHOTRIPSY WITH STENT EXCHANGE    Surgeon(s):  Abdelrahman Aguirre MD    Anesthesia: General    Staff:   Circulator: [place] RN  Scrub Person: Javed Maloney; Chely Fairchild; Yeimi Rosenberg    Indications for procedure:  Right distal ureteral calculus with obstruction status post ureteral stent placement      Ureteroscopic findings: He still had sniffing and edema with papillary type protrusions of the urothelium that looked benign.  Clearly these held the stone in place and were seen at the time of the previous ureteroscopy.  Stone was still intact.    Procedure details:     After appropriate anesthesia, positioning, prep and drape, timeout protocol was observed.      A 22 Spanish cystoscope sheath with a 30° lens is inserted.     Stent is seen exiting the right ureteral orifice.  It is grasped with forceps and pulled out to the external urethral meatus.  The distal curl was removed with scissors.  Through the existing stent I passed a 0.038 mm Sensor guidewire is then passed under direct fluoroscopic vision and manipulated by the stone to reach the renal pelvis.    The Mayer \"needle \"semi-rigid ureteroscope is then passed through the ureteral orifice as the wire was identified.  Care was taken manipulating the ureteroscope up to the level of the stone.  Once accomplished a 200 µm holmium laser fiber is inserted with initial settings of 0.8 J at a repetition rate of 8 for fragmentation and a dusting setting of 0.3 J at a repetition rate of 50.  Stone is fragmented into reasonably small fragments that were able to be removed with a Zero Tip basket.    The wire is then back loaded through the cystoscope and the orifice " visualized with the wire appropriately positioned. I passed a ureteral stent over the guidewire into the right renal pelvis and pulled the wire back seeing a good curl in the renal pelvis on fluoroscopy. The string is brought out the external urethral meatus for ease of removal the wire is removed in its entirety and I could see a good curl of the stent in the bladder. The bladder is then drained. The patient tolerated the procedure without difficulty.      Patient is now transferred to recovery room in stable condition.      Estimated Blood Loss: Less than 30 mL    Specimens:                Specimens       ID Source Type Tests Collected By Collected At Frozen?    A Ureter, Right Calculus TISSUE PATHOLOGY EXAM   Abdelrahman Aguirre MD 11/29/23 0756     Description: RIGHT URETERAL STONE              Drains:   Ureteral Drain/Stent Right ureter 6 Fr. (Active)   Site Assessment NESHA 11/14/23 0733       Ureteral Drain/Stent Right ureter 6 Fr. (Active)       Complications: none    Plan: Patient to follow-up with me in 6 weeks preceded by a renal ultrasound.  Patient may remove his own stent in 2 days or schedule appointment in my office for nurse visit to remove stent.    Abdelrahman Aguirre MD     Date: 11/29/2023  Time: 09:47 CST

## 2023-11-30 LAB
LAB AP CASE REPORT: NORMAL
LAB AP DIAGNOSIS COMMENT: NORMAL
Lab: NORMAL
PATH REPORT.FINAL DX SPEC: NORMAL
PATH REPORT.GROSS SPEC: NORMAL

## 2023-12-01 ENCOUNTER — PROCEDURE VISIT (OUTPATIENT)
Dept: UROLOGY | Facility: CLINIC | Age: 58
End: 2023-12-01
Payer: COMMERCIAL

## 2023-12-01 VITALS — DIASTOLIC BLOOD PRESSURE: 90 MMHG | OXYGEN SATURATION: 96 % | SYSTOLIC BLOOD PRESSURE: 155 MMHG | HEART RATE: 68 BPM

## 2023-12-01 DIAGNOSIS — N20.1 OBSTRUCTION OF RIGHT URETEROPELVIC JUNCTION (UPJ) DUE TO STONE: Primary | ICD-10-CM

## 2023-12-01 NOTE — PROGRESS NOTES
Patient of Dr. Aguirre states he is here today to get his stent removed SP Ureteroscopy Laser Litho with stent placement on 11/29. Patient denies any fever, chills, N&V or hematuria. The tape was removed and using the string stent was pulled with no complications. The patient was advised to continue any medications prescribed in the hospital and to call if he has any questions or concerns. The patient verbalized understanding. Follow up with Dr. Aguirre on 1/11 with Renal US prior. Tay Reina PA-C was in the office for this procedure. .  Juan Carlos  I have reviewed and agree with medical assistance documentation above

## 2024-01-04 ENCOUNTER — TELEPHONE (OUTPATIENT)
Dept: UROLOGY | Facility: CLINIC | Age: 59
End: 2024-01-04
Payer: COMMERCIAL

## 2024-01-04 DIAGNOSIS — N20.1 OBSTRUCTION OF RIGHT URETEROPELVIC JUNCTION (UPJ) DUE TO STONE: Primary | ICD-10-CM

## 2024-01-04 DIAGNOSIS — N20.0 KIDNEY STONES: ICD-10-CM

## 2024-01-04 NOTE — TELEPHONE ENCOUNTER
Caller: Cecil Saucedo    Relationship: Self    Best call back number: [phone]    What orders are you requesting (i.e. lab or imaging): RENAL ULTRASOUND    In what timeframe would the patient need to come in: APPOINTMENT WITH DR. YOUNG 01/30/24.    Where will you receive your lab/imaging services: [facility]    Additional notes: PLEASE ADD THE RENAL ULTRASOUND ORDER TO PATIENT'S CHART AND NOTIFY HIM SO HE CAN SCHEDULE.    APPOINTMENT MOVED FROM 01/11/24.

## 2024-01-04 NOTE — TELEPHONE ENCOUNTER
Orders placed, called pt. And transferred him to Scheduling to get scheduled for renal U/S prior to 1/30. He v/u.

## 2024-01-12 NOTE — PROGRESS NOTES
Chief Complaint  Kidney Stones    Subjective          Cecil Olimpia Saucedo presents to [facility] to follow-up a fairly recent stone episode.  He underwent ureteroscopy 11/29/2023.  I did have to do a stent placement on him about 2 weeks prior due to distal ureteral narrowing.  Passive dilatation allowed me to reach the stone after this.  I put in a stent with a string to let him remove it himself.  He got a renal ultrasound. No recent flank pain.         Current Outpatient Medications:     amlodipine-olmesartan (Hay) 10-40 MG per tablet, Take 1 tablet by mouth Daily., Disp: , Rfl:     omeprazole (priLOSEC) 40 MG capsule, Take 1 capsule by mouth Every Morning., Disp: , Rfl:     predniSONE (DELTASONE) 1 MG tablet, Take 10 tablets by mouth Daily., Disp: , Rfl:     cephalexin (KEFLEX) 500 MG capsule, Take 1 capsule by mouth 3 (Three) Times a Day. Begin two days before the biopsy (Patient not taking: Reported on 1/30/2024), Disp: 6 capsule, Rfl: 0    gabapentin (NEURONTIN) 100 MG capsule, Take 1 capsule by mouth 3 (Three) Times a Day. (Patient not taking: Reported on 1/30/2024), Disp: , Rfl:     tamsulosin (FLOMAX) 0.4 MG capsule 24 hr capsule, Take 1 capsule by mouth 2 (Two) Times a Day. (Patient not taking: Reported on 1/30/2024), Disp: 60 capsule, Rfl: 0  Past Medical History:   Diagnosis Date    Acute kidney injury 11/12/2023    Allergic conjunctivitis     Allergic rhinitis     Cholelithiasis     Chronic sinusitis     Deviated septum     GERD (gastroesophageal reflux disease)     Hypertension     Hypertrophy of nasal turbinates     Kidney stones 11/2023    PONV (postoperative nausea and vomiting)     Refractory obstruction of nasal airway     Rhinitis medicamentosa      Past Surgical History:   Procedure Laterality Date    COLONOSCOPY  10/24/2013    normal exam excpetion hemorrhoids repeat 5 year    COLONOSCOPY N/A 11/09/2023    Procedure: COLONOSCOPY WITH ANESTHESIA;  Surgeon: Ian Woodard  "MD ONUR;  Location: [facility] ENDOSCOPY;  Service: Gastroenterology;  Laterality: N/A;  pre epigastric pain  post polyps  DSizabela Rust    CYSTOSCOPY N/A 11/13/2023    Procedure: CYSTOSCOPY;  Surgeon: Abdelrahman Aguirre MD;  Location: [facility] OR;  Service: Urology;  Laterality: N/A;    ENDOSCOPY N/A 11/09/2023    Procedure: ESOPHAGOGASTRODUODENOSCOPY WITH ANESTHESIA;  Surgeon: Ian Woodard MD;  Location: [facility] ENDOSCOPY;  Service: Gastroenterology;  Laterality: N/A;  pre  post      KNEE SURGERY      NECK SURGERY      SHOULDER SURGERY      SINUS SURGERY  2010    kest, FESS, ITR    TONSILLECTOMY AND ADENOIDECTOMY Bilateral     URETEROSCOPY LASER LITHOTRIPSY WITH STENT INSERTION Right 11/29/2023    Procedure: URETEROSCOPY LASER LITHOTRIPSY WITH STENT EXCHANGE;  Surgeon: Abdelrahman Aguirre MD;  Location: [facility] OR;  Service: Urology;  Laterality: Right;    URETEROSCOPY STENT INSERTION Right 11/13/2023    Procedure: URETEROSCOPY STENT INSERTION;  Surgeon: Abdelrahman Aguirre MD;  Location: [facility] OR;  Service: Urology;  Laterality: Right;    VASECTOMY Bilateral            Review of Systems      Objective   PHYSICAL EXAM  Vital Signs:   Temp 97 °F (36.1 °C)   Ht 167.6 cm (66\")   Wt 88.8 kg (195 lb 12.8 oz)   BMI 31.60 kg/m²     Physical Exam      DATA  Result Review :              Results for orders placed or performed in visit on 01/30/24   POC Urinalysis Dipstick, Multipro    Specimen: Urine   Result Value Ref Range    Color Yellow Yellow, Straw, Dark Yellow, Rubina    Clarity, UA Clear Clear    Glucose, UA Negative Negative mg/dL    Bilirubin Negative Negative    Ketones, UA Negative Negative    Specific Gravity  1.030 1.005 - 1.030    Blood, UA Trace (A) Negative    pH, Urine 5.5 5.0 - 8.0    Protein, POC Negative Negative mg/dL    Urobilinogen, UA 0.2 E.U./dL Normal, 0.2 E.U./dL    Nitrite, UA Negative Negative    Leukocytes Negative Negative       US Renal Bilateral (01/25/2024 07:27)   IMPRESSION:  Normal ultrasound " of the kidneys, there is interval  resolution of right-sided hydronephrosis.   This report was signed and finalized on 1/25/2024 7:42 AM by Dr. Aren Dominguez MD.      ASSESSMENT AND PLAN          Problem List Items Addressed This Visit    None  Visit Diagnoses       Kidney stones    -  Primary    Relevant Orders    POC Urinalysis Dipstick, Multipro (Completed)        No stones on today's Renal US  Will get KUB next year.           FOLLOW UP     Return in about 1 year (around 1/30/2025) for KUB before visit.        (Please note that portions of this note were completed with a voice recognition program.)  Abdelrahman Aguirre MD  01/30/24  10:35 CST

## 2024-01-25 ENCOUNTER — HOSPITAL ENCOUNTER (OUTPATIENT)
Dept: ULTRASOUND IMAGING | Facility: HOSPITAL | Age: 59
Discharge: HOME | End: 2024-01-25
Admitting: UROLOGY
Payer: COMMERCIAL

## 2024-01-25 DIAGNOSIS — N20.0 KIDNEY STONES: ICD-10-CM

## 2024-01-25 DIAGNOSIS — N20.1 OBSTRUCTION OF RIGHT URETEROPELVIC JUNCTION (UPJ) DUE TO STONE: ICD-10-CM

## 2024-01-25 PROCEDURE — 76775 US EXAM ABDO BACK WALL LIM: CPT

## 2024-01-30 ENCOUNTER — OFFICE VISIT (OUTPATIENT)
Dept: UROLOGY | Facility: CLINIC | Age: 59
End: 2024-01-30
Payer: COMMERCIAL

## 2024-01-30 VITALS — HEIGHT: 66 IN | BODY MASS INDEX: 31.47 KG/M2 | TEMPERATURE: 97 F | WEIGHT: 195.8 LBS

## 2024-01-30 DIAGNOSIS — N20.0 KIDNEY STONES: Primary | ICD-10-CM

## 2024-01-30 LAB
BILIRUB BLD-MCNC: NEGATIVE MG/DL
CLARITY, POC: CLEAR
COLOR UR: YELLOW
GLUCOSE UR STRIP-MCNC: NEGATIVE MG/DL
KETONES UR QL: NEGATIVE
LEUKOCYTE EST, POC: NEGATIVE
NITRITE UR-MCNC: NEGATIVE MG/ML
PH UR: 5.5 [PH] (ref 5–8)
PROT UR STRIP-MCNC: NEGATIVE MG/DL
RBC # UR STRIP: ABNORMAL /UL
SP GR UR: 1.03 (ref 1–1.03)
UROBILINOGEN UR QL: ABNORMAL

## 2024-01-30 PROCEDURE — 99213 OFFICE O/P EST LOW 20 MIN: CPT | Performed by: UROLOGY

## 2024-01-30 PROCEDURE — 81003 URINALYSIS AUTO W/O SCOPE: CPT | Performed by: UROLOGY

## (undated) DEVICE — THE SINGLE USE ETRAP – POLYP TRAP IS USED FOR SUCTION RETRIEVAL OF ENDOSCOPICALLY REMOVED POLYPS.: Brand: ETRAP

## (undated) DEVICE — CATH URETH FLEXIMA CONE TP 5F 70CM

## (undated) DEVICE — NITINOL STONE RETRIEVAL BASKET: Brand: ZERO TIP

## (undated) DEVICE — SNAR POLYP CAPTIVATOR RND STFF 2.4 240CM 10MM 1P/U

## (undated) DEVICE — MASK,OXYGEN,MED CONC,ADLT,7' TUB, UC: Brand: PENDING

## (undated) DEVICE — CUFF,BP,DISP,1 TUBE,ADULT,HP: Brand: MEDLINE

## (undated) DEVICE — GOWN,NON-REINFORCED,SIRUS,SET IN SLV,XXL: Brand: MEDLINE

## (undated) DEVICE — BHS TURNOVER CYSTO: Brand: MEDLINE INDUSTRIES, INC.

## (undated) DEVICE — SENSR O2 OXIMAX FNGR A/ 18IN NONSTR

## (undated) DEVICE — GW AMPLTZ SUPERSTIFF STR .035IN 145CM

## (undated) DEVICE — FIBR LASR MOSES 365 DFL 6J 80HZ 120W

## (undated) DEVICE — PK CYSTO 30

## (undated) DEVICE — GW SENSR DUALFLEX NITNL STR .038IN 3X150CM

## (undated) DEVICE — CATH URETRL OPN/END 8F70CM

## (undated) DEVICE — GLV SURG BIOGEL M LTX PF 8

## (undated) DEVICE — Device: Brand: DEFENDO AIR/WATER/SUCTION AND BIOPSY VALVE

## (undated) DEVICE — ENDOSCOPIC SEAL URO 1 SIZE FITS ALL: Brand: ENDOSCOPIC SEAL

## (undated) DEVICE — CONMED SCOPE SAVER BITE BLOCK, 20X27 MM: Brand: SCOPE SAVER

## (undated) DEVICE — CATH URETRL OPN/END 5F70CM

## (undated) DEVICE — YANKAUER,BULB TIP WITH VENT: Brand: ARGYLE

## (undated) DEVICE — THE CHANNEL CLEANING BRUSH IS A NYLON FLEXI BRUSH ATTACHED TO A FLEXIBLE PLASTIC SHEATH DESIGNED TO SAFELY REMOVE DEBRIS FROM FLEXIBLE ENDOSCOPES.

## (undated) DEVICE — FRCP BIOP ENDO CAPTURA/PRO SERR SPK 2.8MM 230CM